# Patient Record
Sex: FEMALE | Race: OTHER | HISPANIC OR LATINO | URBAN - METROPOLITAN AREA
[De-identification: names, ages, dates, MRNs, and addresses within clinical notes are randomized per-mention and may not be internally consistent; named-entity substitution may affect disease eponyms.]

---

## 2017-06-28 ENCOUNTER — INPATIENT (INPATIENT)
Facility: HOSPITAL | Age: 27
LOS: 2 days | Discharge: HOME | End: 2017-07-01
Attending: OBSTETRICS & GYNECOLOGY | Admitting: OBSTETRICS & GYNECOLOGY
Payer: COMMERCIAL

## 2017-06-28 VITALS — WEIGHT: 147.93 LBS

## 2017-06-28 LAB
BASOPHILS NFR BLD AUTO: 0.2 % — SIGNIFICANT CHANGE UP (ref 0–2)
BLD GP AB SCN SERPL QL: NEGATIVE — SIGNIFICANT CHANGE UP
BLD GP AB SCN SERPL QL: NEGATIVE — SIGNIFICANT CHANGE UP
EOSINOPHIL NFR BLD AUTO: 0.7 % — SIGNIFICANT CHANGE UP (ref 0–6)
HCT VFR BLD CALC: 34.2 % — LOW (ref 34.5–45)
HGB BLD-MCNC: 11.4 G/DL — LOW (ref 11.5–15.5)
LYMPHOCYTES # BLD AUTO: 15.9 % — SIGNIFICANT CHANGE UP (ref 13–44)
MCHC RBC-ENTMCNC: 29 PG — SIGNIFICANT CHANGE UP (ref 27–34)
MCHC RBC-ENTMCNC: 33.3 G/DL — SIGNIFICANT CHANGE UP (ref 32–36)
MCV RBC AUTO: 87 FL — SIGNIFICANT CHANGE UP (ref 80–100)
MONOCYTES NFR BLD AUTO: 9.2 % — SIGNIFICANT CHANGE UP (ref 2–14)
NEUTROPHILS NFR BLD AUTO: 74 % — SIGNIFICANT CHANGE UP (ref 43–77)
PLATELET # BLD AUTO: 148 K/UL — LOW (ref 150–400)
RBC # BLD: 3.93 M/UL — SIGNIFICANT CHANGE UP (ref 3.8–5.2)
RBC # FLD: 13.3 % — SIGNIFICANT CHANGE UP (ref 10.3–16.9)
RH IG SCN BLD-IMP: POSITIVE — SIGNIFICANT CHANGE UP
RH IG SCN BLD-IMP: POSITIVE — SIGNIFICANT CHANGE UP
WBC # BLD: 9.7 K/UL — SIGNIFICANT CHANGE UP (ref 3.8–10.5)
WBC # FLD AUTO: 9.7 K/UL — SIGNIFICANT CHANGE UP (ref 3.8–10.5)

## 2017-06-28 RX ORDER — OXYTOCIN 10 UNIT/ML
333.33 VIAL (ML) INJECTION
Qty: 20 | Refills: 0 | Status: DISCONTINUED | OUTPATIENT
Start: 2017-06-28 | End: 2017-06-29

## 2017-06-28 RX ORDER — SODIUM CHLORIDE 9 MG/ML
1000 INJECTION, SOLUTION INTRAVENOUS
Qty: 0 | Refills: 0 | Status: DISCONTINUED | OUTPATIENT
Start: 2017-06-28 | End: 2017-06-29

## 2017-06-28 RX ORDER — SODIUM CHLORIDE 9 MG/ML
1000 INJECTION, SOLUTION INTRAVENOUS ONCE
Qty: 0 | Refills: 0 | Status: COMPLETED | OUTPATIENT
Start: 2017-06-28 | End: 2017-06-28

## 2017-06-28 RX ORDER — PENICILLIN G POTASSIUM 5000000 [IU]/1
5 POWDER, FOR SOLUTION INTRAMUSCULAR; INTRAPLEURAL; INTRATHECAL; INTRAVENOUS ONCE
Qty: 0 | Refills: 0 | Status: COMPLETED | OUTPATIENT
Start: 2017-06-28 | End: 2017-06-28

## 2017-06-28 RX ORDER — PENICILLIN G POTASSIUM 5000000 [IU]/1
POWDER, FOR SOLUTION INTRAMUSCULAR; INTRAPLEURAL; INTRATHECAL; INTRAVENOUS
Qty: 0 | Refills: 0 | Status: DISCONTINUED | OUTPATIENT
Start: 2017-06-28 | End: 2017-06-29

## 2017-06-28 RX ORDER — CITRIC ACID/SODIUM CITRATE 300-500 MG
15 SOLUTION, ORAL ORAL EVERY 4 HOURS
Qty: 0 | Refills: 0 | Status: DISCONTINUED | OUTPATIENT
Start: 2017-06-28 | End: 2017-06-29

## 2017-06-28 RX ORDER — PENICILLIN G POTASSIUM 5000000 [IU]/1
2.5 POWDER, FOR SOLUTION INTRAMUSCULAR; INTRAPLEURAL; INTRATHECAL; INTRAVENOUS EVERY 4 HOURS
Qty: 0 | Refills: 0 | Status: DISCONTINUED | OUTPATIENT
Start: 2017-06-28 | End: 2017-06-29

## 2017-06-28 RX ADMIN — SODIUM CHLORIDE 125 MILLILITER(S): 9 INJECTION, SOLUTION INTRAVENOUS at 21:27

## 2017-06-28 RX ADMIN — SODIUM CHLORIDE 2000 MILLILITER(S): 9 INJECTION, SOLUTION INTRAVENOUS at 18:50

## 2017-06-28 RX ADMIN — PENICILLIN G POTASSIUM 200 MILLION UNIT(S): 5000000 POWDER, FOR SOLUTION INTRAMUSCULAR; INTRAPLEURAL; INTRATHECAL; INTRAVENOUS at 22:55

## 2017-06-28 RX ADMIN — PENICILLIN G POTASSIUM 200 MILLION UNIT(S): 5000000 POWDER, FOR SOLUTION INTRAMUSCULAR; INTRAPLEURAL; INTRATHECAL; INTRAVENOUS at 19:00

## 2017-06-28 RX ADMIN — SODIUM CHLORIDE 125 MILLILITER(S): 9 INJECTION, SOLUTION INTRAVENOUS at 19:40

## 2017-06-29 ENCOUNTER — RESULT REVIEW (OUTPATIENT)
Age: 27
End: 2017-06-29

## 2017-06-29 LAB — T PALLIDUM AB TITR SER: NEGATIVE — SIGNIFICANT CHANGE UP

## 2017-06-29 RX ORDER — SIMETHICONE 80 MG/1
80 TABLET, CHEWABLE ORAL EVERY 6 HOURS
Qty: 0 | Refills: 0 | Status: DISCONTINUED | OUTPATIENT
Start: 2017-06-29 | End: 2017-07-01

## 2017-06-29 RX ORDER — MAGNESIUM HYDROXIDE 400 MG/1
30 TABLET, CHEWABLE ORAL
Qty: 0 | Refills: 0 | Status: DISCONTINUED | OUTPATIENT
Start: 2017-06-29 | End: 2017-07-01

## 2017-06-29 RX ORDER — ACETAMINOPHEN 500 MG
650 TABLET ORAL EVERY 6 HOURS
Qty: 0 | Refills: 0 | Status: DISCONTINUED | OUTPATIENT
Start: 2017-06-29 | End: 2017-07-01

## 2017-06-29 RX ORDER — PRAMOXINE HYDROCHLORIDE 150 MG/15G
1 AEROSOL, FOAM RECTAL EVERY 4 HOURS
Qty: 0 | Refills: 0 | Status: DISCONTINUED | OUTPATIENT
Start: 2017-06-29 | End: 2017-07-01

## 2017-06-29 RX ORDER — AER TRAVELER 0.5 G/1
1 SOLUTION RECTAL; TOPICAL EVERY 4 HOURS
Qty: 0 | Refills: 0 | Status: DISCONTINUED | OUTPATIENT
Start: 2017-06-29 | End: 2017-07-01

## 2017-06-29 RX ORDER — AMPICILLIN TRIHYDRATE 250 MG
2 CAPSULE ORAL EVERY 6 HOURS
Qty: 0 | Refills: 0 | Status: DISCONTINUED | OUTPATIENT
Start: 2017-06-29 | End: 2017-06-30

## 2017-06-29 RX ORDER — TETANUS TOXOID, REDUCED DIPHTHERIA TOXOID AND ACELLULAR PERTUSSIS VACCINE, ADSORBED 5; 2.5; 8; 8; 2.5 [IU]/.5ML; [IU]/.5ML; UG/.5ML; UG/.5ML; UG/.5ML
0.5 SUSPENSION INTRAMUSCULAR ONCE
Qty: 0 | Refills: 0 | Status: DISCONTINUED | OUTPATIENT
Start: 2017-06-29 | End: 2017-07-01

## 2017-06-29 RX ORDER — GLYCERIN ADULT
1 SUPPOSITORY, RECTAL RECTAL AT BEDTIME
Qty: 0 | Refills: 0 | Status: DISCONTINUED | OUTPATIENT
Start: 2017-06-29 | End: 2017-07-01

## 2017-06-29 RX ORDER — GENTAMICIN SULFATE 40 MG/ML
120 VIAL (ML) INJECTION ONCE
Qty: 0 | Refills: 0 | Status: COMPLETED | OUTPATIENT
Start: 2017-06-29 | End: 2017-06-29

## 2017-06-29 RX ORDER — SODIUM CHLORIDE 9 MG/ML
3 INJECTION INTRAMUSCULAR; INTRAVENOUS; SUBCUTANEOUS EVERY 8 HOURS
Qty: 0 | Refills: 0 | Status: DISCONTINUED | OUTPATIENT
Start: 2017-06-29 | End: 2017-07-01

## 2017-06-29 RX ORDER — GENTAMICIN SULFATE 40 MG/ML
VIAL (ML) INJECTION
Qty: 0 | Refills: 0 | Status: DISCONTINUED | OUTPATIENT
Start: 2017-06-29 | End: 2017-06-30

## 2017-06-29 RX ORDER — DIPHENHYDRAMINE HCL 50 MG
25 CAPSULE ORAL EVERY 6 HOURS
Qty: 0 | Refills: 0 | Status: DISCONTINUED | OUTPATIENT
Start: 2017-06-29 | End: 2017-07-01

## 2017-06-29 RX ORDER — OXYTOCIN 10 UNIT/ML
41.67 VIAL (ML) INJECTION
Qty: 20 | Refills: 0 | Status: DISCONTINUED | OUTPATIENT
Start: 2017-06-29 | End: 2017-07-01

## 2017-06-29 RX ORDER — LANOLIN
1 OINTMENT (GRAM) TOPICAL EVERY 6 HOURS
Qty: 0 | Refills: 0 | Status: DISCONTINUED | OUTPATIENT
Start: 2017-06-29 | End: 2017-07-01

## 2017-06-29 RX ORDER — IBUPROFEN 200 MG
600 TABLET ORAL EVERY 6 HOURS
Qty: 0 | Refills: 0 | Status: DISCONTINUED | OUTPATIENT
Start: 2017-06-29 | End: 2017-07-01

## 2017-06-29 RX ORDER — AMPICILLIN TRIHYDRATE 250 MG
2 CAPSULE ORAL ONCE
Qty: 0 | Refills: 0 | Status: COMPLETED | OUTPATIENT
Start: 2017-06-29 | End: 2017-06-29

## 2017-06-29 RX ORDER — DOCUSATE SODIUM 100 MG
100 CAPSULE ORAL
Qty: 0 | Refills: 0 | Status: DISCONTINUED | OUTPATIENT
Start: 2017-06-29 | End: 2017-07-01

## 2017-06-29 RX ORDER — DIBUCAINE 1 %
1 OINTMENT (GRAM) RECTAL EVERY 4 HOURS
Qty: 0 | Refills: 0 | Status: DISCONTINUED | OUTPATIENT
Start: 2017-06-29 | End: 2017-07-01

## 2017-06-29 RX ORDER — HYDROCORTISONE 1 %
1 OINTMENT (GRAM) TOPICAL EVERY 4 HOURS
Qty: 0 | Refills: 0 | Status: DISCONTINUED | OUTPATIENT
Start: 2017-06-29 | End: 2017-07-01

## 2017-06-29 RX ORDER — AMPICILLIN TRIHYDRATE 250 MG
CAPSULE ORAL
Qty: 0 | Refills: 0 | Status: DISCONTINUED | OUTPATIENT
Start: 2017-06-29 | End: 2017-06-30

## 2017-06-29 RX ORDER — GENTAMICIN SULFATE 40 MG/ML
80 VIAL (ML) INJECTION EVERY 8 HOURS
Qty: 0 | Refills: 0 | Status: DISCONTINUED | OUTPATIENT
Start: 2017-06-29 | End: 2017-06-30

## 2017-06-29 RX ORDER — OXYTOCIN 10 UNIT/ML
1 VIAL (ML) INJECTION
Qty: 30 | Refills: 0 | Status: DISCONTINUED | OUTPATIENT
Start: 2017-06-29 | End: 2017-06-29

## 2017-06-29 RX ADMIN — Medication 200 MILLIGRAM(S): at 22:09

## 2017-06-29 RX ADMIN — SODIUM CHLORIDE 3 MILLILITER(S): 9 INJECTION INTRAMUSCULAR; INTRAVENOUS; SUBCUTANEOUS at 23:10

## 2017-06-29 RX ADMIN — Medication 216 GRAM(S): at 05:33

## 2017-06-29 RX ADMIN — Medication 600 MILLIGRAM(S): at 07:15

## 2017-06-29 RX ADMIN — Medication 100 MILLIGRAM(S): at 23:23

## 2017-06-29 RX ADMIN — Medication 100 MILLIGRAM(S): at 03:16

## 2017-06-29 RX ADMIN — Medication 100 MILLIGRAM(S): at 15:13

## 2017-06-29 RX ADMIN — Medication 216 GRAM(S): at 19:41

## 2017-06-29 RX ADMIN — Medication 216 GRAM(S): at 11:47

## 2017-06-29 RX ADMIN — Medication 200 MILLIGRAM(S): at 03:49

## 2017-06-29 RX ADMIN — Medication 600 MILLIGRAM(S): at 06:47

## 2017-06-29 RX ADMIN — SODIUM CHLORIDE 3 MILLILITER(S): 9 INJECTION INTRAMUSCULAR; INTRAVENOUS; SUBCUTANEOUS at 15:11

## 2017-06-29 RX ADMIN — Medication 200 MILLIGRAM(S): at 14:30

## 2017-06-29 RX ADMIN — Medication 125 MILLIUNIT(S)/MIN: at 06:27

## 2017-06-29 RX ADMIN — Medication 600 MILLIGRAM(S): at 14:29

## 2017-06-29 RX ADMIN — Medication 1 MILLIUNIT(S)/MIN: at 03:24

## 2017-06-29 RX ADMIN — PENICILLIN G POTASSIUM 200 MILLION UNIT(S): 5000000 POWDER, FOR SOLUTION INTRAMUSCULAR; INTRAPLEURAL; INTRATHECAL; INTRAVENOUS at 02:45

## 2017-06-30 RX ADMIN — SODIUM CHLORIDE 3 MILLILITER(S): 9 INJECTION INTRAMUSCULAR; INTRAVENOUS; SUBCUTANEOUS at 14:27

## 2017-06-30 RX ADMIN — SODIUM CHLORIDE 3 MILLILITER(S): 9 INJECTION INTRAMUSCULAR; INTRAVENOUS; SUBCUTANEOUS at 06:44

## 2017-06-30 RX ADMIN — Medication 216 GRAM(S): at 13:14

## 2017-06-30 RX ADMIN — Medication 100 MILLIGRAM(S): at 14:39

## 2017-06-30 RX ADMIN — Medication 216 GRAM(S): at 06:43

## 2017-06-30 RX ADMIN — SODIUM CHLORIDE 3 MILLILITER(S): 9 INJECTION INTRAMUSCULAR; INTRAVENOUS; SUBCUTANEOUS at 22:22

## 2017-06-30 RX ADMIN — Medication 200 MILLIGRAM(S): at 06:44

## 2017-06-30 RX ADMIN — Medication 600 MILLIGRAM(S): at 06:41

## 2017-06-30 RX ADMIN — Medication 600 MILLIGRAM(S): at 13:15

## 2017-06-30 RX ADMIN — Medication 216 GRAM(S): at 00:50

## 2017-06-30 RX ADMIN — Medication 1 TABLET(S): at 13:15

## 2017-06-30 RX ADMIN — Medication 600 MILLIGRAM(S): at 20:35

## 2017-06-30 RX ADMIN — Medication 600 MILLIGRAM(S): at 19:56

## 2017-06-30 RX ADMIN — Medication 200 MILLIGRAM(S): at 15:20

## 2017-06-30 RX ADMIN — Medication 600 MILLIGRAM(S): at 13:47

## 2017-06-30 RX ADMIN — Medication 600 MILLIGRAM(S): at 07:17

## 2017-06-30 RX ADMIN — Medication 100 MILLIGRAM(S): at 06:45

## 2017-06-30 RX ADMIN — Medication 650 MILLIGRAM(S): at 23:12

## 2017-06-30 NOTE — PROGRESS NOTE ADULT - SUBJECTIVE AND OBJECTIVE BOX
Patient evaluated at bedside. Denies fever and chills.  She reports pain is well controlled.  She has been ambulating without assistance, voiding spontaneously, and is breastfeeding.  She has no concerns at this time.    Physical Exam:  T(C): 36.7 (06-30-17 @ 06:00), Max: 37.4 (06-29-17 @ 22:35)  HR: 77 (06-30-17 @ 06:00) (77 - 89)  BP: 110/76 (06-30-17 @ 06:00) (90/60 - 110/76)  RR: 18 (06-30-17 @ 06:00) (18 - 18)  SpO2: 100% (06-30-17 @ 06:00) (98% - 100%)  Wt(kg): --    GA: NAD, A+0 x 3  Abd: soft, nontender, nondistended, no rebound or guarding, uterus firm at midline, below the umbilicus  : lochia WNL  Extremities: no calf tenderness                            11.4   9.7   )-----------( 148      ( 28 Jun 2017 19:19 )             34.2

## 2017-06-30 NOTE — PROGRESS NOTE ADULT - ASSESSMENT
A/P 26y PPD# s/p  c/b chorioamnionitis, stable  1. chorioamnionitis - IV a/g/c for 24 hours  2. Pain: well controlled on OPM  2. GI: Regular diet  3. : voiding  4. DVT prophylaxis: ambulate  5. Dispo: PPD 2, unless otherwise specified

## 2017-07-01 VITALS
HEART RATE: 92 BPM | OXYGEN SATURATION: 98 % | DIASTOLIC BLOOD PRESSURE: 70 MMHG | RESPIRATION RATE: 18 BRPM | TEMPERATURE: 98 F | SYSTOLIC BLOOD PRESSURE: 109 MMHG

## 2017-07-01 PROCEDURE — 36415 COLL VENOUS BLD VENIPUNCTURE: CPT

## 2017-07-01 PROCEDURE — 88307 TISSUE EXAM BY PATHOLOGIST: CPT

## 2017-07-01 PROCEDURE — 86900 BLOOD TYPING SEROLOGIC ABO: CPT

## 2017-07-01 PROCEDURE — 86850 RBC ANTIBODY SCREEN: CPT

## 2017-07-01 PROCEDURE — 85025 COMPLETE CBC W/AUTO DIFF WBC: CPT

## 2017-07-01 PROCEDURE — 86901 BLOOD TYPING SEROLOGIC RH(D): CPT

## 2017-07-01 PROCEDURE — 86780 TREPONEMA PALLIDUM: CPT

## 2017-07-01 RX ORDER — IBUPROFEN 200 MG
1 TABLET ORAL
Qty: 0 | Refills: 0 | COMMUNITY
Start: 2017-07-01

## 2017-07-01 RX ADMIN — Medication 1 TABLET(S): at 09:55

## 2017-07-01 RX ADMIN — Medication 600 MILLIGRAM(S): at 07:28

## 2017-07-01 RX ADMIN — SODIUM CHLORIDE 3 MILLILITER(S): 9 INJECTION INTRAMUSCULAR; INTRAVENOUS; SUBCUTANEOUS at 06:11

## 2017-07-01 RX ADMIN — Medication 650 MILLIGRAM(S): at 00:05

## 2017-07-01 RX ADMIN — Medication 600 MILLIGRAM(S): at 08:09

## 2017-07-01 NOTE — DISCHARGE NOTE OB - HOSPITAL COURSE
Pt is s/p . Today is PPD#2 and she is currently stable. She was diagnosed with chorioamnionitis and was treated with IV ampicillin, gentamicin, and clindamycin for 24 hrs. She is afebrile, WBC wnl. Her pain is under control with ibuprofen. She will be discharged home.

## 2017-07-01 NOTE — PROGRESS NOTE ADULT - SUBJECTIVE AND OBJECTIVE BOX
Patient evaluated at bedside.   She reports pain is well controlled.  She denies headache, dizziness, chest pain, palpitations, shortness of breathe, nausea, vomiting, heavy vaginal bleeding or perineal discomfort.  She has been ambulating without assistance, voiding spontaneously, and is breastfeeding.    Physical Exam:  Vital Signs Last 24 Hrs  T(C): 36.8 (01 Jul 2017 06:05), Max: 36.8 (30 Jun 2017 18:37)  T(F): 98.3 (01 Jul 2017 06:05), Max: 98.3 (01 Jul 2017 06:05)  HR: 84 (01 Jul 2017 06:05) (73 - 84)  BP: 112/69 (01 Jul 2017 06:05) (104/74 - 112/69)  BP(mean): --  RR: 18 (01 Jul 2017 06:05) (18 - 18)  SpO2: 99% (01 Jul 2017 06:05) (99% - 99%)    GA: NAD, A+0 x 3  CV: RRR  Pulm: CTAB  Breasts: soft, nontender, no palpable masses  Abd: + BS, soft, nontender, nondistended, no rebound or guarding, uterus firm at midline, 2 fb below umbilicus  : lochia WNL  Extremities: no swelling or calf tenderness, reflexes +2 bilaterally

## 2017-07-01 NOTE — DISCHARGE NOTE OB - CARE PROVIDER_API CALL
Alex Barba), Obstetrics and Gynecology  [address] [place] [address]  Phone: [phone]  Fax: [phone]

## 2017-07-01 NOTE — DISCHARGE NOTE OB - PATIENT PORTAL LINK FT
“You can access the FollowHealth Patient Portal, offered by [facility], by registering with the following website: [email or web address]”

## 2017-07-01 NOTE — PROGRESS NOTE ADULT - ASSESSMENT
A/P yo 26y s/p , PPD #2, stable  - Chorio: afebrile, asymptomatic. S/p IV amp/gent/clinda x24 hrs afebrile.  - Pain: Motrin prn  - GI: Reg diet  - Encourage breastfeeding  - DVT prophylaxis: early ambulation  - Dispo: PP2

## 2017-07-01 NOTE — DISCHARGE NOTE OB - MEDICATION SUMMARY - MEDICATIONS TO TAKE
I will START or STAY ON the medications listed below when I get home from the hospital:    ibuprofen 600 mg oral tablet  -- 1 tab(s) by mouth every 6 hours, As needed, Moderate Pain  -- Indication: For PREGNANCY

## 2017-07-03 LAB — SURGICAL PATHOLOGY STUDY: SIGNIFICANT CHANGE UP

## 2017-07-05 DIAGNOSIS — O41.1230 CHORIOAMNIONITIS, THIRD TRIMESTER, NOT APPLICABLE OR UNSPECIFIED: ICD-10-CM

## 2017-07-05 DIAGNOSIS — O99.820 STREPTOCOCCUS B CARRIER STATE COMPLICATING PREGNANCY: ICD-10-CM

## 2017-07-05 DIAGNOSIS — Z34.03 ENCOUNTER FOR SUPERVISION OF NORMAL FIRST PREGNANCY, THIRD TRIMESTER: ICD-10-CM

## 2017-07-05 DIAGNOSIS — Z53.29 PROCEDURE AND TREATMENT NOT CARRIED OUT BECAUSE OF PATIENT'S DECISION FOR OTHER REASONS: ICD-10-CM

## 2017-07-05 DIAGNOSIS — Z3A.37 37 WEEKS GESTATION OF PREGNANCY: ICD-10-CM

## 2022-04-01 ENCOUNTER — APPOINTMENT (OUTPATIENT)
Dept: ANTEPARTUM | Facility: CLINIC | Age: 32
End: 2022-04-01
Payer: COMMERCIAL

## 2022-04-01 ENCOUNTER — ASOB RESULT (OUTPATIENT)
Age: 32
End: 2022-04-01

## 2022-04-01 PROCEDURE — 76805 OB US >/= 14 WKS SNGL FETUS: CPT

## 2022-04-01 PROCEDURE — 76817 TRANSVAGINAL US OBSTETRIC: CPT

## 2022-04-18 ENCOUNTER — APPOINTMENT (OUTPATIENT)
Dept: ANTEPARTUM | Facility: CLINIC | Age: 32
End: 2022-04-18
Payer: COMMERCIAL

## 2022-04-18 ENCOUNTER — ASOB RESULT (OUTPATIENT)
Age: 32
End: 2022-04-18

## 2022-04-18 PROCEDURE — 76816 OB US FOLLOW-UP PER FETUS: CPT

## 2022-07-26 ENCOUNTER — INPATIENT (INPATIENT)
Facility: HOSPITAL | Age: 32
LOS: 1 days | Discharge: HOME | End: 2022-07-28
Attending: OBSTETRICS & GYNECOLOGY | Admitting: OBSTETRICS & GYNECOLOGY
Payer: COMMERCIAL

## 2022-07-26 VITALS — HEIGHT: 63 IN | WEIGHT: 162.92 LBS

## 2022-07-26 LAB
BASOPHILS # BLD AUTO: 0.03 K/UL — SIGNIFICANT CHANGE UP (ref 0–0.2)
BASOPHILS NFR BLD AUTO: 0.4 % — SIGNIFICANT CHANGE UP (ref 0–2)
BLD GP AB SCN SERPL QL: NEGATIVE — SIGNIFICANT CHANGE UP
COVID-19 SPIKE DOMAIN AB INTERP: POSITIVE
COVID-19 SPIKE DOMAIN ANTIBODY RESULT: >250 U/ML — HIGH
EOSINOPHIL # BLD AUTO: 0.08 K/UL — SIGNIFICANT CHANGE UP (ref 0–0.5)
EOSINOPHIL NFR BLD AUTO: 1 % — SIGNIFICANT CHANGE UP (ref 0–6)
HCT VFR BLD CALC: 33.3 % — LOW (ref 34.5–45)
HGB BLD-MCNC: 10.7 G/DL — LOW (ref 11.5–15.5)
IMM GRANULOCYTES NFR BLD AUTO: 1.3 % — SIGNIFICANT CHANGE UP (ref 0–1.5)
LYMPHOCYTES # BLD AUTO: 1.61 K/UL — SIGNIFICANT CHANGE UP (ref 1–3.3)
LYMPHOCYTES # BLD AUTO: 20.3 % — SIGNIFICANT CHANGE UP (ref 13–44)
MCHC RBC-ENTMCNC: 27.7 PG — SIGNIFICANT CHANGE UP (ref 27–34)
MCHC RBC-ENTMCNC: 32.1 GM/DL — SIGNIFICANT CHANGE UP (ref 32–36)
MCV RBC AUTO: 86.3 FL — SIGNIFICANT CHANGE UP (ref 80–100)
MONOCYTES # BLD AUTO: 0.59 K/UL — SIGNIFICANT CHANGE UP (ref 0–0.9)
MONOCYTES NFR BLD AUTO: 7.4 % — SIGNIFICANT CHANGE UP (ref 2–14)
NEUTROPHILS # BLD AUTO: 5.52 K/UL — SIGNIFICANT CHANGE UP (ref 1.8–7.4)
NEUTROPHILS NFR BLD AUTO: 69.6 % — SIGNIFICANT CHANGE UP (ref 43–77)
NRBC # BLD: 0 /100 WBCS — SIGNIFICANT CHANGE UP (ref 0–0)
PLATELET # BLD AUTO: 162 K/UL — SIGNIFICANT CHANGE UP (ref 150–400)
RBC # BLD: 3.86 M/UL — SIGNIFICANT CHANGE UP (ref 3.8–5.2)
RBC # FLD: 13.9 % — SIGNIFICANT CHANGE UP (ref 10.3–14.5)
RH IG SCN BLD-IMP: POSITIVE — SIGNIFICANT CHANGE UP
RH IG SCN BLD-IMP: POSITIVE — SIGNIFICANT CHANGE UP
SARS-COV-2 IGG+IGM SERPL QL IA: >250 U/ML — HIGH
SARS-COV-2 IGG+IGM SERPL QL IA: POSITIVE
SARS-COV-2 RNA SPEC QL NAA+PROBE: NEGATIVE — SIGNIFICANT CHANGE UP
T PALLIDUM AB TITR SER: NEGATIVE — SIGNIFICANT CHANGE UP
WBC # BLD: 7.93 K/UL — SIGNIFICANT CHANGE UP (ref 3.8–10.5)
WBC # FLD AUTO: 7.93 K/UL — SIGNIFICANT CHANGE UP (ref 3.8–10.5)

## 2022-07-26 RX ORDER — HYDROCORTISONE 1 %
1 OINTMENT (GRAM) TOPICAL EVERY 6 HOURS
Refills: 0 | Status: DISCONTINUED | OUTPATIENT
Start: 2022-07-26 | End: 2022-07-28

## 2022-07-26 RX ORDER — IBUPROFEN 200 MG
600 TABLET ORAL EVERY 6 HOURS
Refills: 0 | Status: COMPLETED | OUTPATIENT
Start: 2022-07-26 | End: 2023-06-24

## 2022-07-26 RX ORDER — ACETAMINOPHEN 500 MG
975 TABLET ORAL
Refills: 0 | Status: DISCONTINUED | OUTPATIENT
Start: 2022-07-26 | End: 2022-07-28

## 2022-07-26 RX ORDER — DIPHENHYDRAMINE HCL 50 MG
25 CAPSULE ORAL EVERY 6 HOURS
Refills: 0 | Status: DISCONTINUED | OUTPATIENT
Start: 2022-07-26 | End: 2022-07-28

## 2022-07-26 RX ORDER — OXYCODONE HYDROCHLORIDE 5 MG/1
5 TABLET ORAL ONCE
Refills: 0 | Status: DISCONTINUED | OUTPATIENT
Start: 2022-07-26 | End: 2022-07-28

## 2022-07-26 RX ORDER — PRAMOXINE HYDROCHLORIDE 150 MG/15G
1 AEROSOL, FOAM RECTAL EVERY 4 HOURS
Refills: 0 | Status: DISCONTINUED | OUTPATIENT
Start: 2022-07-26 | End: 2022-07-28

## 2022-07-26 RX ORDER — CHLORHEXIDINE GLUCONATE 213 G/1000ML
1 SOLUTION TOPICAL ONCE
Refills: 0 | Status: DISCONTINUED | OUTPATIENT
Start: 2022-07-26 | End: 2022-07-26

## 2022-07-26 RX ORDER — BENZOCAINE 10 %
1 GEL (GRAM) MUCOUS MEMBRANE EVERY 6 HOURS
Refills: 0 | Status: DISCONTINUED | OUTPATIENT
Start: 2022-07-26 | End: 2022-07-28

## 2022-07-26 RX ORDER — IBUPROFEN 200 MG
600 TABLET ORAL EVERY 6 HOURS
Refills: 0 | Status: DISCONTINUED | OUTPATIENT
Start: 2022-07-26 | End: 2022-07-28

## 2022-07-26 RX ORDER — AER TRAVELER 0.5 G/1
1 SOLUTION RECTAL; TOPICAL EVERY 4 HOURS
Refills: 0 | Status: DISCONTINUED | OUTPATIENT
Start: 2022-07-26 | End: 2022-07-28

## 2022-07-26 RX ORDER — SODIUM CHLORIDE 9 MG/ML
1000 INJECTION, SOLUTION INTRAVENOUS
Refills: 0 | Status: DISCONTINUED | OUTPATIENT
Start: 2022-07-26 | End: 2022-07-26

## 2022-07-26 RX ORDER — CITRIC ACID/SODIUM CITRATE 300-500 MG
15 SOLUTION, ORAL ORAL EVERY 6 HOURS
Refills: 0 | Status: DISCONTINUED | OUTPATIENT
Start: 2022-07-26 | End: 2022-07-26

## 2022-07-26 RX ORDER — OXYTOCIN 10 UNIT/ML
333.33 VIAL (ML) INJECTION
Qty: 20 | Refills: 0 | Status: DISCONTINUED | OUTPATIENT
Start: 2022-07-26 | End: 2022-07-26

## 2022-07-26 RX ORDER — SODIUM CHLORIDE 9 MG/ML
3 INJECTION INTRAMUSCULAR; INTRAVENOUS; SUBCUTANEOUS EVERY 8 HOURS
Refills: 0 | Status: DISCONTINUED | OUTPATIENT
Start: 2022-07-26 | End: 2022-07-28

## 2022-07-26 RX ORDER — MAGNESIUM HYDROXIDE 400 MG/1
30 TABLET, CHEWABLE ORAL
Refills: 0 | Status: DISCONTINUED | OUTPATIENT
Start: 2022-07-26 | End: 2022-07-28

## 2022-07-26 RX ORDER — TETANUS TOXOID, REDUCED DIPHTHERIA TOXOID AND ACELLULAR PERTUSSIS VACCINE, ADSORBED 5; 2.5; 8; 8; 2.5 [IU]/.5ML; [IU]/.5ML; UG/.5ML; UG/.5ML; UG/.5ML
0.5 SUSPENSION INTRAMUSCULAR ONCE
Refills: 0 | Status: DISCONTINUED | OUTPATIENT
Start: 2022-07-26 | End: 2022-07-28

## 2022-07-26 RX ORDER — FENTANYL/BUPIVACAINE/NS/PF 2MCG/ML-.1
250 PLASTIC BAG, INJECTION (ML) INJECTION
Refills: 0 | Status: DISCONTINUED | OUTPATIENT
Start: 2022-07-26 | End: 2022-07-26

## 2022-07-26 RX ORDER — LANOLIN
1 OINTMENT (GRAM) TOPICAL EVERY 6 HOURS
Refills: 0 | Status: DISCONTINUED | OUTPATIENT
Start: 2022-07-26 | End: 2022-07-28

## 2022-07-26 RX ORDER — DIBUCAINE 1 %
1 OINTMENT (GRAM) RECTAL EVERY 6 HOURS
Refills: 0 | Status: DISCONTINUED | OUTPATIENT
Start: 2022-07-26 | End: 2022-07-28

## 2022-07-26 RX ORDER — SIMETHICONE 80 MG/1
80 TABLET, CHEWABLE ORAL EVERY 4 HOURS
Refills: 0 | Status: DISCONTINUED | OUTPATIENT
Start: 2022-07-26 | End: 2022-07-28

## 2022-07-26 RX ORDER — KETOROLAC TROMETHAMINE 30 MG/ML
30 SYRINGE (ML) INJECTION ONCE
Refills: 0 | Status: DISCONTINUED | OUTPATIENT
Start: 2022-07-26 | End: 2022-07-26

## 2022-07-26 RX ORDER — OXYTOCIN 10 UNIT/ML
333.33 VIAL (ML) INJECTION
Qty: 20 | Refills: 0 | Status: DISCONTINUED | OUTPATIENT
Start: 2022-07-26 | End: 2022-07-28

## 2022-07-26 RX ORDER — OXYCODONE HYDROCHLORIDE 5 MG/1
5 TABLET ORAL
Refills: 0 | Status: DISCONTINUED | OUTPATIENT
Start: 2022-07-26 | End: 2022-07-28

## 2022-07-26 RX ADMIN — Medication 975 MILLIGRAM(S): at 11:10

## 2022-07-26 RX ADMIN — Medication 975 MILLIGRAM(S): at 16:45

## 2022-07-26 RX ADMIN — Medication 600 MILLIGRAM(S): at 21:40

## 2022-07-26 RX ADMIN — Medication 30 MILLIGRAM(S): at 07:48

## 2022-07-26 RX ADMIN — Medication 600 MILLIGRAM(S): at 20:40

## 2022-07-26 RX ADMIN — Medication 600 MILLIGRAM(S): at 13:35

## 2022-07-26 RX ADMIN — Medication 975 MILLIGRAM(S): at 23:23

## 2022-07-26 RX ADMIN — Medication 1 TABLET(S): at 13:35

## 2022-07-26 RX ADMIN — SODIUM CHLORIDE 3 MILLILITER(S): 9 INJECTION INTRAMUSCULAR; INTRAVENOUS; SUBCUTANEOUS at 21:16

## 2022-07-26 NOTE — LACTATION INITIAL EVALUATION - NS LACT CON REASON FOR REQ
38wk baby seen around 12 hrs of life. This is a second  baby for mom,  current 3 year old for 3 years with good milk supply noted. Baby placed skin to skin on mothers chest. Complete breastfeeding education was provided, manual expression technique taught with proper return demo, colostrum easily expressible bilaterally and drops offered to baby until baby began to wake and root towards breast. Positioning and latch strategies were taught. In a cradle hold, baby deeply latched and developed an organized, rhythmic suck with interittent swallows noted. Baby continues to feed for 10+ min and mother confirms a strong pull of the nipple and initial soreness that discipates with sustained deep sucking.  Responsive/ frequent feeds, continued/ increased SSC and rooming-in were encouraged. All questions were answered, mother verbalized understanding of teaching and info given./NICU admission

## 2022-07-26 NOTE — LACTATION INITIAL EVALUATION - LACTATION INTERVENTIONS
initiate/review safe skin-to-skin/initiate/review hand expression/initiate/review techniques for position and latch/reviewed components of an effective feeding and at least 8 effective feedings per day required/reviewed benefits and recommendations for rooming in/reviewed feeding on demand/by cue at least 8 times a day/reviewed indications of inadequate milk transfer that would require supplementation

## 2022-07-26 NOTE — PATIENT PROFILE OB - POST PARTUM DEPRESSION SCREEN OB 5
Currently in a flare. Will offer Bentyl for discomfort. Referral placed to [address] [place] [address] no

## 2022-07-26 NOTE — LACTATION INITIAL EVALUATION - LATCH: COMFORT (BREAST/NIPPLE) INFANT
This is a recent snapshot of the patient's [facility] medical record.  For current drug dose and complete information and questions, call [phone] or In Basket pool, [facility]v home infusion ([id number])  CSN Number:  [id number]    
(2) soft/nontender

## 2022-07-27 ENCOUNTER — TRANSCRIPTION ENCOUNTER (OUTPATIENT)
Age: 32
End: 2022-07-27

## 2022-07-27 RX ORDER — ACETAMINOPHEN 500 MG
3 TABLET ORAL
Qty: 0 | Refills: 0 | DISCHARGE
Start: 2022-07-27

## 2022-07-27 RX ADMIN — Medication 975 MILLIGRAM(S): at 23:54

## 2022-07-27 RX ADMIN — Medication 1 TABLET(S): at 14:42

## 2022-07-27 RX ADMIN — Medication 600 MILLIGRAM(S): at 02:04

## 2022-07-27 RX ADMIN — Medication 600 MILLIGRAM(S): at 22:14

## 2022-07-27 RX ADMIN — Medication 975 MILLIGRAM(S): at 18:59

## 2022-07-27 RX ADMIN — Medication 600 MILLIGRAM(S): at 15:35

## 2022-07-27 RX ADMIN — Medication 975 MILLIGRAM(S): at 13:42

## 2022-07-27 RX ADMIN — Medication 975 MILLIGRAM(S): at 07:40

## 2022-07-27 RX ADMIN — Medication 975 MILLIGRAM(S): at 06:40

## 2022-07-27 RX ADMIN — Medication 600 MILLIGRAM(S): at 21:14

## 2022-07-27 RX ADMIN — SODIUM CHLORIDE 3 MILLILITER(S): 9 INJECTION INTRAMUSCULAR; INTRAVENOUS; SUBCUTANEOUS at 22:00

## 2022-07-27 RX ADMIN — Medication 975 MILLIGRAM(S): at 00:23

## 2022-07-27 RX ADMIN — Medication 600 MILLIGRAM(S): at 03:04

## 2022-07-27 RX ADMIN — Medication 975 MILLIGRAM(S): at 12:00

## 2022-07-27 RX ADMIN — SODIUM CHLORIDE 3 MILLILITER(S): 9 INJECTION INTRAMUSCULAR; INTRAVENOUS; SUBCUTANEOUS at 06:05

## 2022-07-27 RX ADMIN — SODIUM CHLORIDE 3 MILLILITER(S): 9 INJECTION INTRAMUSCULAR; INTRAVENOUS; SUBCUTANEOUS at 14:42

## 2022-07-27 RX ADMIN — Medication 600 MILLIGRAM(S): at 09:17

## 2022-07-27 RX ADMIN — Medication 975 MILLIGRAM(S): at 18:00

## 2022-07-27 RX ADMIN — Medication 600 MILLIGRAM(S): at 10:42

## 2022-07-27 NOTE — DISCHARGE NOTE OB - NS MD DC FALL RISK RISK
For information on Fall & Injury Prevention, visit: [email or web address] OR  [email or web address] OR  https://www.cdc.gov/steadi/patient.html

## 2022-07-27 NOTE — DISCHARGE NOTE OB - PATIENT PORTAL LINK FT
You can access the FollowMyHealth Patient Portal offered by [facility] by registering at the following website: [email or web address]. By joining [employer]’s FollowMyHealth portal, you will also be able to view your health information using other applications (apps) compatible with our system.

## 2022-07-27 NOTE — DISCHARGE NOTE OB - CARE PLAN
1 Principal Discharge DX:	Vaginal delivery  Assessment and plan of treatment:	Patient is status post a vaginal delivery. She had an uncomplicated postpartum course and has met her postpartum milestones appropriately.  Vitals are stable for discharge.

## 2022-07-27 NOTE — DISCHARGE NOTE OB - CARE PROVIDER_API CALL
Alex Barba)  Obstetrics and Gynecology  [address] [place] [address]  Phone: [phone]  Fax: [phone]  Follow Up Time:

## 2022-07-27 NOTE — DISCHARGE NOTE OB - MEDICATION SUMMARY - MEDICATIONS TO TAKE
I will START or STAY ON the medications listed below when I get home from the hospital:    ibuprofen 600 mg oral tablet  -- 1 tab(s) by mouth every 6 hours, As needed, Moderate Pain  -- Indication: For Pain     acetaminophen 325 mg oral tablet  -- 3 tab(s) by mouth every 6 hours  -- Indication: For Pain

## 2022-07-28 VITALS
TEMPERATURE: 98 F | HEART RATE: 78 BPM | OXYGEN SATURATION: 99 % | RESPIRATION RATE: 18 BRPM | SYSTOLIC BLOOD PRESSURE: 116 MMHG | DIASTOLIC BLOOD PRESSURE: 70 MMHG

## 2022-07-28 PROCEDURE — 86769 SARS-COV-2 COVID-19 ANTIBODY: CPT

## 2022-07-28 PROCEDURE — 86780 TREPONEMA PALLIDUM: CPT

## 2022-07-28 PROCEDURE — 86850 RBC ANTIBODY SCREEN: CPT

## 2022-07-28 PROCEDURE — 36415 COLL VENOUS BLD VENIPUNCTURE: CPT

## 2022-07-28 PROCEDURE — 86900 BLOOD TYPING SEROLOGIC ABO: CPT

## 2022-07-28 PROCEDURE — 87635 SARS-COV-2 COVID-19 AMP PRB: CPT

## 2022-07-28 PROCEDURE — 59050 FETAL MONITOR W/REPORT: CPT

## 2022-07-28 PROCEDURE — 86901 BLOOD TYPING SEROLOGIC RH(D): CPT

## 2022-07-28 PROCEDURE — 85025 COMPLETE CBC W/AUTO DIFF WBC: CPT

## 2022-07-28 RX ADMIN — Medication 975 MILLIGRAM(S): at 13:46

## 2022-07-28 RX ADMIN — Medication 975 MILLIGRAM(S): at 12:15

## 2022-07-28 RX ADMIN — Medication 975 MILLIGRAM(S): at 00:54

## 2022-07-28 RX ADMIN — Medication 975 MILLIGRAM(S): at 06:15

## 2022-07-28 RX ADMIN — Medication 600 MILLIGRAM(S): at 09:18

## 2022-07-28 RX ADMIN — SODIUM CHLORIDE 3 MILLILITER(S): 9 INJECTION INTRAMUSCULAR; INTRAVENOUS; SUBCUTANEOUS at 07:40

## 2022-07-28 RX ADMIN — Medication 975 MILLIGRAM(S): at 05:14

## 2022-07-28 RX ADMIN — Medication 1 TABLET(S): at 12:25

## 2022-07-28 RX ADMIN — Medication 600 MILLIGRAM(S): at 10:00

## 2022-07-28 NOTE — PROGRESS NOTE ADULT - ASSESSMENT
PPD#1 sp   1-ambulation  2-reg diet
A/P 32y s/p , PPD#1, stable, meeting postpartum milestones   - Pain: well controlled on tylenol/motrin  - GI: Tolerating regular diet  - : urinating without difficulty/pain  - DVT prophylaxis: ambulating frequently  - Dispo: PPD 2, unless otherwise specified    
A/P 32y s/p , PPD#2, stable, meeting postpartum milestones   - Pain: well controlled on tylenol/motrin  - GI: Tolerating regular diet  - : urinating without difficulty/pain  - DVT prophylaxis: ambulating frequently  - Dispo: PPD 2, unless otherwise specified

## 2022-07-28 NOTE — PROGRESS NOTE ADULT - SUBJECTIVE AND OBJECTIVE BOX
Patient evaluated at bedside this morning, resting comfortably in bed, no acute events overnight.  She reports pain is well controlled with tylenol and motrin.  Reports decrease in amount of vaginal bleeding.  She has been ambulating without assistance, voiding spontaneously.  Tolerating food well, without nausea/vomit.      Physical Exam:  T(C): 36.9 (07-28-22 @ 05:34), Max: 36.9 (07-28-22 @ 05:34)  HR: 71 (07-28-22 @ 05:34) (71 - 71)  BP: 128/81 (07-28-22 @ 05:34) (128/81 - 128/81)  RR: 18 (07-28-22 @ 05:34) (18 - 18)  SpO2: 98% (07-28-22 @ 05:34) (98% - 98%)    GA: NAD, patient is alert and oriented  Resp: No increased work of breathing, breathing comfortably on RA  Abd: Soft, nontender, nondistended, no rebound or guarding, uterus firm.  Extremities: no swelling or calf tenderness    acetaminophen     Tablet .. 975 milliGRAM(s) Oral <User Schedule>  benzocaine 20%/menthol 0.5% Spray 1 Spray(s) Topical every 6 hours PRN  dibucaine 1% Ointment 1 Application(s) Topical every 6 hours PRN  diphenhydrAMINE 25 milliGRAM(s) Oral every 6 hours PRN  diphtheria/tetanus/pertussis (acellular) Vaccine (ADAcel) 0.5 milliLiter(s) IntraMuscular once  hydrocortisone 1% Cream 1 Application(s) Topical every 6 hours PRN  ibuprofen  Tablet. 600 milliGRAM(s) Oral every 6 hours  lanolin Ointment 1 Application(s) Topical every 6 hours PRN  magnesium hydroxide Suspension 30 milliLiter(s) Oral two times a day PRN  oxyCODONE    IR 5 milliGRAM(s) Oral every 3 hours PRN  oxyCODONE    IR 5 milliGRAM(s) Oral once PRN  oxytocin Infusion 333.333 milliUNIT(s)/Min IV Continuous <Continuous>  pramoxine 1%/zinc 5% Cream 1 Application(s) Topical every 4 hours PRN  prenatal multivitamin 1 Tablet(s) Oral daily  simethicone 80 milliGRAM(s) Chew every 4 hours PRN  sodium chloride 0.9% lock flush 3 milliLiter(s) IV Push every 8 hours  witch hazel Pads 1 Application(s) Topical every 4 hours PRN  
Patient evaluated at bedside this morning, resting comfortably in bed, no acute events overnight.  She reports pain is well controlled with tylenol and motrin.  She denies headache, dizziness, shortness of breath, nausea, vomiting. Reports decrease in amount of vaginal bleeding.  She has been ambulating without assistance, voiding spontaneously.  Tolerating food well, without nausea/vomit.      Physical Exam:  T(C): 36.7 (07-26-22 @ 22:15), Max: 36.7 (07-26-22 @ 22:15)  HR: 82 (07-26-22 @ 22:15) (82 - 82)  BP: 118/71 (07-26-22 @ 22:15) (118/71 - 118/71)  RR: 18 (07-26-22 @ 22:15) (18 - 18)  SpO2: 99% (07-26-22 @ 22:15) (99% - 99%)    GA: NAD, patient is alert and oriented  Resp: No increased work of breathing, breathing comfortably on RA  Abd: Soft, nontender, nondistended, no rebound or guarding, uterus firm.  Extremities: no swelling or calf tenderness                          10.7   7.93  )-----------( 162      ( 26 Jul 2022 02:45 )             33.3           acetaminophen     Tablet .. 975 milliGRAM(s) Oral <User Schedule>  benzocaine 20%/menthol 0.5% Spray 1 Spray(s) Topical every 6 hours PRN  dibucaine 1% Ointment 1 Application(s) Topical every 6 hours PRN  diphenhydrAMINE 25 milliGRAM(s) Oral every 6 hours PRN  diphtheria/tetanus/pertussis (acellular) Vaccine (ADAcel) 0.5 milliLiter(s) IntraMuscular once  hydrocortisone 1% Cream 1 Application(s) Topical every 6 hours PRN  ibuprofen  Tablet. 600 milliGRAM(s) Oral every 6 hours  lanolin Ointment 1 Application(s) Topical every 6 hours PRN  magnesium hydroxide Suspension 30 milliLiter(s) Oral two times a day PRN  oxyCODONE    IR 5 milliGRAM(s) Oral every 3 hours PRN  oxyCODONE    IR 5 milliGRAM(s) Oral once PRN  oxytocin Infusion 333.333 milliUNIT(s)/Min IV Continuous <Continuous>  pramoxine 1%/zinc 5% Cream 1 Application(s) Topical every 4 hours PRN  prenatal multivitamin 1 Tablet(s) Oral daily  simethicone 80 milliGRAM(s) Chew every 4 hours PRN  sodium chloride 0.9% lock flush 3 milliLiter(s) IV Push every 8 hours  witch hazel Pads 1 Application(s) Topical every 4 hours PRN  
pt without complaints

## 2022-08-01 DIAGNOSIS — O42.92 FULL-TERM PREMATURE RUPTURE OF MEMBRANES, UNSPECIFIED AS TO LENGTH OF TIME BETWEEN RUPTURE AND ONSET OF LABOR: ICD-10-CM

## 2022-08-01 DIAGNOSIS — Z3A.38 38 WEEKS GESTATION OF PREGNANCY: ICD-10-CM

## 2022-08-01 DIAGNOSIS — Z34.83 ENCOUNTER FOR SUPERVISION OF OTHER NORMAL PREGNANCY, THIRD TRIMESTER: ICD-10-CM

## 2022-11-01 NOTE — DISCHARGE NOTE OB - CARE PLAN
error   Principal Discharge DX:	Postpartum state  Goal:	Pain control, bonding with child  Instructions for follow-up, activity and diet:	Pelvic rest

## 2022-11-26 ENCOUNTER — EMERGENCY (EMERGENCY)
Facility: HOSPITAL | Age: 32
LOS: 1 days | Discharge: HOME | End: 2022-11-26
Attending: STUDENT IN AN ORGANIZED HEALTH CARE EDUCATION/TRAINING PROGRAM | Admitting: STUDENT IN AN ORGANIZED HEALTH CARE EDUCATION/TRAINING PROGRAM

## 2022-11-26 VITALS
OXYGEN SATURATION: 100 % | HEART RATE: 71 BPM | RESPIRATION RATE: 16 BRPM | TEMPERATURE: 98 F | SYSTOLIC BLOOD PRESSURE: 107 MMHG | DIASTOLIC BLOOD PRESSURE: 64 MMHG

## 2022-11-26 VITALS
SYSTOLIC BLOOD PRESSURE: 119 MMHG | DIASTOLIC BLOOD PRESSURE: 78 MMHG | RESPIRATION RATE: 16 BRPM | OXYGEN SATURATION: 100 % | TEMPERATURE: 98 F | HEART RATE: 102 BPM

## 2022-11-26 LAB
ALBUMIN SERPL ELPH-MCNC: 4.6 G/DL — SIGNIFICANT CHANGE UP (ref 3.3–5)
ALP SERPL-CCNC: 111 U/L — SIGNIFICANT CHANGE UP (ref 40–120)
ALT FLD-CCNC: 11 U/L — SIGNIFICANT CHANGE UP (ref 4–33)
ANION GAP SERPL CALC-SCNC: 10 MMOL/L — SIGNIFICANT CHANGE UP (ref 7–14)
APPEARANCE UR: CLEAR — SIGNIFICANT CHANGE UP
AST SERPL-CCNC: 15 U/L — SIGNIFICANT CHANGE UP (ref 4–32)
B PERT DNA SPEC QL NAA+PROBE: SIGNIFICANT CHANGE UP
B PERT+PARAPERT DNA PNL SPEC NAA+PROBE: SIGNIFICANT CHANGE UP
BACTERIA # UR AUTO: NEGATIVE — SIGNIFICANT CHANGE UP
BASOPHILS # BLD AUTO: 0.04 K/UL — SIGNIFICANT CHANGE UP (ref 0–0.2)
BASOPHILS NFR BLD AUTO: 0.8 % — SIGNIFICANT CHANGE UP (ref 0–2)
BILIRUB SERPL-MCNC: <0.2 MG/DL — SIGNIFICANT CHANGE UP (ref 0.2–1.2)
BILIRUB UR-MCNC: NEGATIVE — SIGNIFICANT CHANGE UP
BORDETELLA PARAPERTUSSIS (RAPRVP): SIGNIFICANT CHANGE UP
BUN SERPL-MCNC: 7 MG/DL — SIGNIFICANT CHANGE UP (ref 7–23)
C PNEUM DNA SPEC QL NAA+PROBE: SIGNIFICANT CHANGE UP
CALCIUM SERPL-MCNC: 9.7 MG/DL — SIGNIFICANT CHANGE UP (ref 8.4–10.5)
CHLORIDE SERPL-SCNC: 102 MMOL/L — SIGNIFICANT CHANGE UP (ref 98–107)
CO2 SERPL-SCNC: 26 MMOL/L — SIGNIFICANT CHANGE UP (ref 22–31)
COLOR SPEC: YELLOW — SIGNIFICANT CHANGE UP
CREAT SERPL-MCNC: 0.63 MG/DL — SIGNIFICANT CHANGE UP (ref 0.5–1.3)
DIFF PNL FLD: ABNORMAL
EGFR: 119 ML/MIN/1.73M2 — SIGNIFICANT CHANGE UP
EOSINOPHIL # BLD AUTO: 0.07 K/UL — SIGNIFICANT CHANGE UP (ref 0–0.5)
EOSINOPHIL NFR BLD AUTO: 1.4 % — SIGNIFICANT CHANGE UP (ref 0–6)
EPI CELLS # UR: 1 /HPF — SIGNIFICANT CHANGE UP (ref 0–5)
FLUAV SUBTYP SPEC NAA+PROBE: SIGNIFICANT CHANGE UP
FLUBV RNA SPEC QL NAA+PROBE: SIGNIFICANT CHANGE UP
GLUCOSE SERPL-MCNC: 98 MG/DL — SIGNIFICANT CHANGE UP (ref 70–99)
GLUCOSE UR QL: NEGATIVE — SIGNIFICANT CHANGE UP
HADV DNA SPEC QL NAA+PROBE: SIGNIFICANT CHANGE UP
HCG SERPL-ACNC: <5 MIU/ML — SIGNIFICANT CHANGE UP
HCOV 229E RNA SPEC QL NAA+PROBE: SIGNIFICANT CHANGE UP
HCOV HKU1 RNA SPEC QL NAA+PROBE: SIGNIFICANT CHANGE UP
HCOV NL63 RNA SPEC QL NAA+PROBE: SIGNIFICANT CHANGE UP
HCOV OC43 RNA SPEC QL NAA+PROBE: SIGNIFICANT CHANGE UP
HCT VFR BLD CALC: 39.8 % — SIGNIFICANT CHANGE UP (ref 34.5–45)
HGB BLD-MCNC: 13 G/DL — SIGNIFICANT CHANGE UP (ref 11.5–15.5)
HMPV RNA SPEC QL NAA+PROBE: SIGNIFICANT CHANGE UP
HPIV1 RNA SPEC QL NAA+PROBE: SIGNIFICANT CHANGE UP
HPIV2 RNA SPEC QL NAA+PROBE: SIGNIFICANT CHANGE UP
HPIV3 RNA SPEC QL NAA+PROBE: SIGNIFICANT CHANGE UP
HPIV4 RNA SPEC QL NAA+PROBE: SIGNIFICANT CHANGE UP
IANC: 3.32 K/UL — SIGNIFICANT CHANGE UP (ref 1.8–7.4)
IMM GRANULOCYTES NFR BLD AUTO: 0 % — SIGNIFICANT CHANGE UP (ref 0–0.9)
KETONES UR-MCNC: NEGATIVE — SIGNIFICANT CHANGE UP
LEUKOCYTE ESTERASE UR-ACNC: NEGATIVE — SIGNIFICANT CHANGE UP
LYMPHOCYTES # BLD AUTO: 1.38 K/UL — SIGNIFICANT CHANGE UP (ref 1–3.3)
LYMPHOCYTES # BLD AUTO: 26.7 % — SIGNIFICANT CHANGE UP (ref 13–44)
M PNEUMO DNA SPEC QL NAA+PROBE: SIGNIFICANT CHANGE UP
MAGNESIUM SERPL-MCNC: 2 MG/DL — SIGNIFICANT CHANGE UP (ref 1.6–2.6)
MCHC RBC-ENTMCNC: 29 PG — SIGNIFICANT CHANGE UP (ref 27–34)
MCHC RBC-ENTMCNC: 32.7 GM/DL — SIGNIFICANT CHANGE UP (ref 32–36)
MCV RBC AUTO: 88.6 FL — SIGNIFICANT CHANGE UP (ref 80–100)
MONOCYTES # BLD AUTO: 0.36 K/UL — SIGNIFICANT CHANGE UP (ref 0–0.9)
MONOCYTES NFR BLD AUTO: 7 % — SIGNIFICANT CHANGE UP (ref 2–14)
NEUTROPHILS # BLD AUTO: 3.32 K/UL — SIGNIFICANT CHANGE UP (ref 1.8–7.4)
NEUTROPHILS NFR BLD AUTO: 64.1 % — SIGNIFICANT CHANGE UP (ref 43–77)
NITRITE UR-MCNC: NEGATIVE — SIGNIFICANT CHANGE UP
NRBC # BLD: 0 /100 WBCS — SIGNIFICANT CHANGE UP (ref 0–0)
NRBC # FLD: 0 K/UL — SIGNIFICANT CHANGE UP (ref 0–0)
PH UR: 5.5 — SIGNIFICANT CHANGE UP (ref 5–8)
PLATELET # BLD AUTO: 170 K/UL — SIGNIFICANT CHANGE UP (ref 150–400)
POTASSIUM SERPL-MCNC: 4.2 MMOL/L — SIGNIFICANT CHANGE UP (ref 3.5–5.3)
POTASSIUM SERPL-SCNC: 4.2 MMOL/L — SIGNIFICANT CHANGE UP (ref 3.5–5.3)
PROT SERPL-MCNC: 8.4 G/DL — HIGH (ref 6–8.3)
PROT UR-MCNC: NEGATIVE — SIGNIFICANT CHANGE UP
RAPID RVP RESULT: DETECTED
RBC # BLD: 4.49 M/UL — SIGNIFICANT CHANGE UP (ref 3.8–5.2)
RBC # FLD: 14.9 % — HIGH (ref 10.3–14.5)
RBC CASTS # UR COMP ASSIST: 7 /HPF — HIGH (ref 0–4)
RSV RNA SPEC QL NAA+PROBE: SIGNIFICANT CHANGE UP
RV+EV RNA SPEC QL NAA+PROBE: DETECTED
SARS-COV-2 RNA SPEC QL NAA+PROBE: SIGNIFICANT CHANGE UP
SODIUM SERPL-SCNC: 138 MMOL/L — SIGNIFICANT CHANGE UP (ref 135–145)
SP GR SPEC: 1.02 — SIGNIFICANT CHANGE UP (ref 1.01–1.05)
UROBILINOGEN FLD QL: SIGNIFICANT CHANGE UP
WBC # BLD: 5.17 K/UL — SIGNIFICANT CHANGE UP (ref 3.8–10.5)
WBC # FLD AUTO: 5.17 K/UL — SIGNIFICANT CHANGE UP (ref 3.8–10.5)
WBC UR QL: 1 /HPF — SIGNIFICANT CHANGE UP (ref 0–5)

## 2022-11-26 PROCEDURE — 99285 EMERGENCY DEPT VISIT HI MDM: CPT

## 2022-11-26 PROCEDURE — 74176 CT ABD & PELVIS W/O CONTRAST: CPT | Mod: 26,GC,MA

## 2022-11-26 RX ORDER — KETOROLAC TROMETHAMINE 30 MG/ML
15 SYRINGE (ML) INJECTION ONCE
Refills: 0 | Status: DISCONTINUED | OUTPATIENT
Start: 2022-11-26 | End: 2022-11-26

## 2022-11-26 RX ADMIN — Medication 15 MILLIGRAM(S): at 17:45

## 2022-11-26 NOTE — ED PROVIDER NOTE - NS ED ROS FT
CONST: no fevers, no chills, no trauma  EYES: no blurry vision   ENT: no sore throat  CV: no chest pain, no extremity swelling  RESP: no shortness of breath  ABD: no abdominal pain, no nausea, no vomiting, no diarrhea, no melena, L flank pain.  : no dysuria, no hematuria, no frequency  MSK: no back pain, no neck pain, no extremity pain  NEURO: no headache, no focal weakness, no dizziness  HEME: no bruising  SKIN: no rash

## 2022-11-26 NOTE — ED ADULT NURSE NOTE - OBJECTIVE STATEMENT
Patient received in stretcher. AOx4. Respirations even and unlabored. Presents to ER c/o L flank pain x "couple of days" As per patient she was sleeping with her kids in bed when she woke up the next day with the pain. Patient states "I was uncomfortable, so I thought it was because of that" Denies any urinary s/s. IV placed. Labs drawn. Medicated as per MD orders. Comfort and safety maintained. all current care needs met. Care plan continues Chloe QUEEN

## 2022-11-26 NOTE — ED PROVIDER NOTE - PATIENT PORTAL LINK FT
You can access the FollowMyHealth Patient Portal offered by [facility] by registering at the following website: [email or web address]. By joining [employer]’s FollowMyHealth portal, you will also be able to view your health information using other applications (apps) compatible with our system.

## 2022-11-26 NOTE — ED PROVIDER NOTE - NSFOLLOWUPINSTRUCTIONS_ED_ALL_ED_FT
You were seen in the Emergency Department for pain in your left flank. You received a CT scan of your abdomen, which did not show a renal stone or any other acute reasons for your pain. You were positive for enterorhino virus, but you have no symptoms, so this could possibly represent early infection or incidental finding. Your pain improved with pain medication. You can take up to 600 mg of ibuprofen every 6 hours for pain relief.    1) Advance activity as tolerated.   2) Continue all previously prescribed medications as directed.    3) Follow up with your primary care physician in 24-48 hours - take copies of your results.    4) Return to the Emergency Department for worsening or persistent symptoms, and/or ANY NEW OR CONCERNING SYMPTOMS.

## 2022-11-26 NOTE — ED PROVIDER NOTE - PHYSICAL EXAMINATION
Const: not in acute distress, but looks uncomfortable.  Eyes: no conjunctival injection  HEENT: Head NCAT, Moist MM.  Neck: Trachea midline.   CVS: +S1/S2, Peripheral pulses 2+ and equal in all extremities.  RESP: Unlabored respiratory effort. Clear to auscultation bilaterally.  GI: Nontender/Nondistended, No CVA tenderness b/l.   MSK: Normocephalic/Atraumatic, No Lower Extremities edema b/l.   Skin: Intact.   Neuro: CNs II-XII grossly intact. Motor & Sensation grossly intact.  Psych: Awake, Alert, & Oriented (AAO) x3.

## 2022-11-26 NOTE — ED PROVIDER NOTE - OBJECTIVE STATEMENT
33 yo female with no significant PMhx, presenting with L flank pain for last 4 days. Pain initially bilateral lower rib pain, but migrated to L flank. Patient states pain is positional in nature, described as sharp, throbbing, intermittent, not tender to palpation. Patient denies fever, cough, sore throat, CP, SOB, abdominal pain, n/v, and diarrhea. Patient recently gave birth 5 months ago, vaginal delivery, uncomplicated.

## 2022-11-26 NOTE — ED PROVIDER NOTE - PROGRESS NOTE DETAILS
MD Zeina (PGY-1) Positive for enterorhino virus. Asymptomatic overall. Pain controlled with the pain medication. Pain likely 2/2 MSK. Will look to dispo.

## 2022-11-26 NOTE — ED PROVIDER NOTE - CLINICAL SUMMARY MEDICAL DECISION MAKING FREE TEXT BOX
33 yo female with no significant PMhx, presenting with L flank pain for last 4 days. Vitals stable. Concern for nephrolithiasis vs MSK pain. Will get CBC, CMP, flu with COVID, UA/UC, CT non con and treat pain.

## 2022-11-27 LAB
CULTURE RESULTS: NO GROWTH — SIGNIFICANT CHANGE UP
SPECIMEN SOURCE: SIGNIFICANT CHANGE UP

## 2023-10-27 NOTE — DISCHARGE NOTE OB - BREAST MILK PROVIDES PROTECTION AGAINST INFECTION
Physical Therapy Treatment Note           Name: Zuly Hernandez    : 1937 (86 y.o.)  MRN: [id number]           TREATMENT SUMMARY AND RECOMMENDATIONS:    PT Received On: 10/27/23  PT Start Time: 1058     PT Stop Time: 1127  PT Total Time (min): 29 min     Subjective Assessment:   No complaints  Lethargic   x Awake, alert, cooperative  Uncooperative    Agitated x c/o pain --neck and PEG site    Appropriate  c/o fatigue    Confused  Treated at bedside     Emotionally labile x Treated in gym/dept.    Impulsive  Other:    Flat affect       Therapy Precautions:    Cognitive deficits x Spinal precautions    Collar - hard x Sternal precautions   x Collar - soft   TLSO   x Fall risk  LSO    Hip precautions - posterior  Knee immobilizer    Hip precautions - anterior  WBAT    Impaired communication  Partial weightbearing    Oxygen  TTWB   x PEG tube  NWB    Visual deficits  Other:   x Hearing deficits          Treatment Objectives:     Mobility Training:   Assist level Comments    Bed mobility     Transfer     Gait CGA X65ft using RW; step-through gait pattern with discontinuous steps   Sit to stand transitions MIN A From wc level   Sitting balance     Standing balance      Wheelchair mobility     Car transfer     Other:          Therapeutic Exercise:   Exercise Sets Reps Comments   LE cycling   10 minutes F   Pectoralis mm stretch 3 30 sec Performed short sitting                    Additional Comments:  Soft tissue massage performed to posterior thoracic and cervical spinal muscles and upper traps x5 minutes - patient reporting relief following    Assessment: Patient tolerated session fair, limited 2/2 posterior cervical pain and PEG site pain. Both nursing and MD aware.    PT Plan: continue POC  Revisions made to plan of care: No    GOALS:   Multidisciplinary Problems       Physical Therapy Goals          Problem: Physical Therapy    Goal Priority Disciplines Outcome Goal Variances Interventions   Physical  Therapy Goal     PT, PT/OT Ongoing, Progressing     Description: Goals to be met by: Discharge     Patient will increase functional independence with mobility by performin. Supine to sit with Stand-by Assistance  2. Sit to supine with Stand-by Assistance  3. Sit to stand transfer with Stand-by Assistance  4. Bed to chair transfer with Stand-by Assistance using Rolling Walker  5. Gait  x 50 feet with Contact Guard Assistance using Rolling Walker.     New goal:   6. Family members and/or sitters will demonstrate [place] and safety with assisting patient with all functional mobility                         Skilled PT Minutes Provided: 29 minutes   Communication with Treatment Team:     Equipment recommendations:       At end of treatment, patient remained:  x Up in chair     Up in wheelchair in room    In bed   x With alarm activated    Bed rails up   x Call bell in reach    x Family/friends present    Restraints secured properly    In bathroom with CNA/RN notified    Nurse aware    In gym with therapist/tech    Other:      Statement Selected

## 2024-05-04 ENCOUNTER — EMERGENCY (EMERGENCY)
Facility: HOSPITAL | Age: 34
LOS: 1 days | Discharge: HOME | End: 2024-05-04
Attending: EMERGENCY MEDICINE | Admitting: EMERGENCY MEDICINE
Payer: COMMERCIAL

## 2024-05-04 VITALS
DIASTOLIC BLOOD PRESSURE: 78 MMHG | TEMPERATURE: 99 F | WEIGHT: 134.92 LBS | SYSTOLIC BLOOD PRESSURE: 131 MMHG | RESPIRATION RATE: 20 BRPM | HEART RATE: 87 BPM | HEIGHT: 63 IN | OXYGEN SATURATION: 99 %

## 2024-05-04 VITALS
TEMPERATURE: 99 F | RESPIRATION RATE: 16 BRPM | DIASTOLIC BLOOD PRESSURE: 67 MMHG | HEART RATE: 71 BPM | SYSTOLIC BLOOD PRESSURE: 121 MMHG | OXYGEN SATURATION: 99 %

## 2024-05-04 PROBLEM — Z78.9 OTHER SPECIFIED HEALTH STATUS: Chronic | Status: ACTIVE | Noted: 2022-11-26

## 2024-05-04 LAB
ADD ON TEST-SPECIMEN IN LAB: SIGNIFICANT CHANGE UP
ANION GAP SERPL CALC-SCNC: 10 MMOL/L — SIGNIFICANT CHANGE UP (ref 5–17)
APPEARANCE UR: CLEAR — SIGNIFICANT CHANGE UP
BACTERIA # UR AUTO: NEGATIVE /HPF — SIGNIFICANT CHANGE UP
BASOPHILS # BLD AUTO: 0.03 K/UL — SIGNIFICANT CHANGE UP (ref 0–0.2)
BASOPHILS NFR BLD AUTO: 0.6 % — SIGNIFICANT CHANGE UP (ref 0–2)
BILIRUB UR-MCNC: NEGATIVE — SIGNIFICANT CHANGE UP
BUN SERPL-MCNC: 7 MG/DL — SIGNIFICANT CHANGE UP (ref 7–23)
CALCIUM SERPL-MCNC: 9.9 MG/DL — SIGNIFICANT CHANGE UP (ref 8.4–10.5)
CHLORIDE SERPL-SCNC: 102 MMOL/L — SIGNIFICANT CHANGE UP (ref 96–108)
CO2 SERPL-SCNC: 26 MMOL/L — SIGNIFICANT CHANGE UP (ref 22–31)
COLOR SPEC: YELLOW — SIGNIFICANT CHANGE UP
CREAT SERPL-MCNC: 0.69 MG/DL — SIGNIFICANT CHANGE UP (ref 0.5–1.3)
DIFF PNL FLD: ABNORMAL
EGFR: 116 ML/MIN/1.73M2 — SIGNIFICANT CHANGE UP
EOSINOPHIL # BLD AUTO: 0.24 K/UL — SIGNIFICANT CHANGE UP (ref 0–0.5)
EOSINOPHIL NFR BLD AUTO: 4.9 % — SIGNIFICANT CHANGE UP (ref 0–6)
GLUCOSE SERPL-MCNC: 107 MG/DL — HIGH (ref 70–99)
GLUCOSE UR QL: NEGATIVE MG/DL — SIGNIFICANT CHANGE UP
HCG SERPL-ACNC: 1168 MIU/ML — HIGH
HCT VFR BLD CALC: 42 % — SIGNIFICANT CHANGE UP (ref 34.5–45)
HGB BLD-MCNC: 14.1 G/DL — SIGNIFICANT CHANGE UP (ref 11.5–15.5)
IMM GRANULOCYTES NFR BLD AUTO: 0.2 % — SIGNIFICANT CHANGE UP (ref 0–0.9)
KETONES UR-MCNC: NEGATIVE MG/DL — SIGNIFICANT CHANGE UP
LEUKOCYTE ESTERASE UR-ACNC: NEGATIVE — SIGNIFICANT CHANGE UP
LYMPHOCYTES # BLD AUTO: 1.77 K/UL — SIGNIFICANT CHANGE UP (ref 1–3.3)
LYMPHOCYTES # BLD AUTO: 36 % — SIGNIFICANT CHANGE UP (ref 13–44)
MCHC RBC-ENTMCNC: 30.7 PG — SIGNIFICANT CHANGE UP (ref 27–34)
MCHC RBC-ENTMCNC: 33.6 GM/DL — SIGNIFICANT CHANGE UP (ref 32–36)
MCV RBC AUTO: 91.3 FL — SIGNIFICANT CHANGE UP (ref 80–100)
MONOCYTES # BLD AUTO: 0.23 K/UL — SIGNIFICANT CHANGE UP (ref 0–0.9)
MONOCYTES NFR BLD AUTO: 4.7 % — SIGNIFICANT CHANGE UP (ref 2–14)
NEUTROPHILS # BLD AUTO: 2.64 K/UL — SIGNIFICANT CHANGE UP (ref 1.8–7.4)
NEUTROPHILS NFR BLD AUTO: 53.6 % — SIGNIFICANT CHANGE UP (ref 43–77)
NITRITE UR-MCNC: NEGATIVE — SIGNIFICANT CHANGE UP
NRBC # BLD: 0 /100 WBCS — SIGNIFICANT CHANGE UP (ref 0–0)
PH UR: 7 — SIGNIFICANT CHANGE UP (ref 5–8)
PLATELET # BLD AUTO: 202 K/UL — SIGNIFICANT CHANGE UP (ref 150–400)
POTASSIUM SERPL-MCNC: 3.7 MMOL/L — SIGNIFICANT CHANGE UP (ref 3.5–5.3)
POTASSIUM SERPL-SCNC: 3.7 MMOL/L — SIGNIFICANT CHANGE UP (ref 3.5–5.3)
PROT UR-MCNC: NEGATIVE MG/DL — SIGNIFICANT CHANGE UP
RBC # BLD: 4.6 M/UL — SIGNIFICANT CHANGE UP (ref 3.8–5.2)
RBC # FLD: 12.9 % — SIGNIFICANT CHANGE UP (ref 10.3–14.5)
RBC CASTS # UR COMP ASSIST: 3 /HPF — SIGNIFICANT CHANGE UP (ref 0–4)
SODIUM SERPL-SCNC: 138 MMOL/L — SIGNIFICANT CHANGE UP (ref 135–145)
SP GR SPEC: 1.01 — SIGNIFICANT CHANGE UP (ref 1–1.03)
SQUAMOUS # UR AUTO: 2 /HPF — SIGNIFICANT CHANGE UP (ref 0–5)
UROBILINOGEN FLD QL: 0.2 MG/DL — SIGNIFICANT CHANGE UP (ref 0.2–1)
WBC # BLD: 4.92 K/UL — SIGNIFICANT CHANGE UP (ref 3.8–10.5)
WBC # FLD AUTO: 4.92 K/UL — SIGNIFICANT CHANGE UP (ref 3.8–10.5)
WBC UR QL: 0 /HPF — SIGNIFICANT CHANGE UP (ref 0–5)

## 2024-05-04 PROCEDURE — 80076 HEPATIC FUNCTION PANEL: CPT

## 2024-05-04 PROCEDURE — 81001 URINALYSIS AUTO W/SCOPE: CPT

## 2024-05-04 PROCEDURE — 86850 RBC ANTIBODY SCREEN: CPT

## 2024-05-04 PROCEDURE — 76801 OB US < 14 WKS SINGLE FETUS: CPT

## 2024-05-04 PROCEDURE — 36415 COLL VENOUS BLD VENIPUNCTURE: CPT

## 2024-05-04 PROCEDURE — 80048 BASIC METABOLIC PNL TOTAL CA: CPT

## 2024-05-04 PROCEDURE — 96372 THER/PROPH/DIAG INJ SC/IM: CPT

## 2024-05-04 PROCEDURE — 86901 BLOOD TYPING SEROLOGIC RH(D): CPT

## 2024-05-04 PROCEDURE — 99285 EMERGENCY DEPT VISIT HI MDM: CPT

## 2024-05-04 PROCEDURE — 76801 OB US < 14 WKS SINGLE FETUS: CPT | Mod: 26

## 2024-05-04 PROCEDURE — 84702 CHORIONIC GONADOTROPIN TEST: CPT

## 2024-05-04 PROCEDURE — 85025 COMPLETE CBC W/AUTO DIFF WBC: CPT

## 2024-05-04 PROCEDURE — 76817 TRANSVAGINAL US OBSTETRIC: CPT | Mod: 26

## 2024-05-04 PROCEDURE — 76817 TRANSVAGINAL US OBSTETRIC: CPT

## 2024-05-04 PROCEDURE — 99284 EMERGENCY DEPT VISIT MOD MDM: CPT | Mod: 25

## 2024-05-04 PROCEDURE — 86900 BLOOD TYPING SEROLOGIC ABO: CPT

## 2024-05-04 RX ORDER — METHOTREXATE 2.5 MG/1
82 TABLET ORAL ONCE
Refills: 0 | Status: COMPLETED | OUTPATIENT
Start: 2024-05-04 | End: 2024-05-04

## 2024-05-04 RX ADMIN — METHOTREXATE 82 MILLIGRAM(S): 2.5 TABLET ORAL at 19:42

## 2024-05-04 NOTE — CONSULT NOTE ADULT - ASSESSMENT
33 y.o.  at 5+1 by LMP 3/29 presenting with newly diagnosed left adnexal ectopic found on u/s today when she went for termination of pregnancy,  - Pt is clinically and hemodynamically stable given normal vitals and Hgb 14.1  - Pt is likely not ruptured at this time given she has normal vitals, no complaints, benign physical exam and completely normal labs, and no free fluid on TVUS.  - Pt counseled on her options for treatment of suspected ectopic and decision made to proceed with MTX one dose protocol for management. Medical management is appropriate for this pt at this time rather than surgical management.  - Risks of MTX including but not limited to GI complaints, possible rupture of ectopic, possible need for re-dose, and possible need for surgery discussed. Risk of surgery including infection, bleeding, damage to adjacent organs, scarring with salpingostomy, and possible removal of fallopian tube. She is a candidate for Methotrexate tx. Methotrexate ___ mg IM once into buttock. Pt does not have contraindications to MTX including blood dyscrasia, active pulmonary disease, hepatic/renal disease, or hematologic dysfunction  - Pt was counseled on avoiding folic acid containing foods, prenatal vitamins, alcohol, breastfeeding, as well as intercourse. -Pt understands she cannot attempt to conceive for at least 3 months.  - Pt to follow up for day 4 beta on   for repeat bHCG check and then on 5/10 for day 7 beta to see if there is a drop of 15% that is adequate or if pt will require an additional dose of MTX.  - Pt counseled on strict return precautions such as severe abdominal pain, heavy bleeding soaking more than 1 pad/hr for two hours or any symptoms of anemia.    D/w: Dr. Santoyo and Dr. Dominguez         33 y.o.  at 5+1 by LMP 3/29 presenting with newly diagnosed left adnexal ectopic found on u/s today when she went for termination of pregnancy,  - Pt is clinically and hemodynamically stable given normal vitals and Hgb 14.1  - Pt is likely not ruptured at this time given she has normal vitals, no complaints, benign physical exam and completely normal labs, and no free fluid on TVUS.  - Pt counseled on her options for treatment of suspected ectopic and decision made to proceed with MTX one dose protocol for management. Medical management is appropriate for this pt at this time rather than surgical management.  - Risks of MTX including but not limited to GI complaints, possible rupture of ectopic, possible need for re-dose, and possible need for surgery discussed. Risk of surgery including infection, bleeding, damage to adjacent organs, scarring with salpingostomy, and possible removal of fallopian tube. She is a candidate for Methotrexate tx. Methotrexate 82 mg IM once into buttock. Pt does not have contraindications to MTX including blood dyscrasia, active pulmonary disease, hepatic/renal disease, or hematologic dysfunction  - Pt was counseled on avoiding folic acid containing foods, prenatal vitamins, alcohol, breastfeeding, as well as intercourse. Pt is currently breastfeeding and endorses she understands she would need to stop if she receives methotrexate Pt also  understands she cannot attempt to conceive for at least 3 months.  - Pt to follow up for day 4 beta on   for repeat bHCG check and then on 5/10 for day 7 beta to see if there is a drop of 15% that is adequate or if pt will require an additional dose of MTX.  - Pt counseled on strict return precautions such as severe abdominal pain, heavy bleeding soaking more than 1 pad/hr for two hours or any symptoms of anemia.    D/w: Dr. Santoyo and Dr. Dominguez         33 y.o.  at 5+1 by LMP 3/29 presenting with newly diagnosed left adnexal ectopic found on u/s today when she went for termination of pregnancy,  - Pt is clinically and hemodynamically stable given normal vitals and Hgb 14.1  - Pt is likely not ruptured at this time given she has normal vitals, no complaints, benign physical exam and completely normal labs, and no free fluid on TVUS.  - Pt counseled on her options for treatment of suspected ectopic and decision made to proceed with MTX one dose protocol for management. Medical management is appropriate for this pt at this time rather than surgical management.  - Risks of MTX including but not limited to GI complaints, possible rupture of ectopic, possible need for re-dose, and possible need for surgery discussed. Risk of surgery including infection, bleeding, damage to adjacent organs, scarring with salpingostomy, and possible removal of fallopian tube. She is a candidate for Methotrexate tx. Methotrexate 82 mg IM once into buttock. Pt does not have contraindications to MTX including blood dyscrasia, active pulmonary disease, hepatic/renal disease, or hematologic dysfunction  - Pt was counseled on avoiding folic acid containing foods, prenatal vitamins, alcohol, breastfeeding, as well as intercourse. Pt is currently breastfeeding and endorses she understands she would need to stop if she receives methotrexate Pt also  understands she cannot attempt to conceive for at least 3 months.  - Pt to follow up for day 4 beta on   for repeat bHCG check and then on 5/10 for day 7 beta to see if there is a drop of 15% that is adequate or if pt will require an additional dose of MTX.  - Pt counseled on strict return precautions such as severe abdominal pain, heavy bleeding soaking more than 1 pad/hr for two hours or any symptoms of anemia.    D/w: Dr. Santoyo and Dr. Barba

## 2024-05-04 NOTE — ED PROVIDER NOTE - CARE PROVIDER_API CALL
Alex Barba  Obstetrics and Gynecology  [address] [place] [address], [address]  [place] [address]  Phone: [phone]  Fax: [phone]  Follow Up Time:

## 2024-05-04 NOTE — CONSULT NOTE ADULT - SUBJECTIVE AND OBJECTIVE BOX
33 y.o.  at 5+1 by LMP 3/29 presenting with newly diagnosed left adnexal ectopic found on u/s today when she went for termination of pregnancy, asymptomatic  Pt denies fever, chills, chest pain, SOB, abdominal pain, nausea, vomiting, vaginal bleeding    OBHx: LMP 3/29  GYNHX: denies STD/STI, cysts, fibroids, abnormal pap  PMHx:   SHx:   Meds:   Allergies:   Social hx:     PHYSICAL EXAM:   Vital Signs Last 24 Hrs  T(C): 37.3 (04 May 2024 13:45), Max: 37.3 (04 May 2024 13:45)  T(F): 99.1 (04 May 2024 13:45), Max: 99.1 (04 May 2024 13:45)  HR: 87 (04 May 2024 13:45) (87 - 87)  BP: 131/78 (04 May 2024 13:45) (131/78 - 131/78)  BP(mean): --  RR: 20 (04 May 2024 13:45) (20 - 20)  SpO2: 99% (04 May 2024 13:45) (99% - 99%)    Parameters below as of 04 May 2024 13:45  Patient On (Oxygen Delivery Method): room air        **************************  Constitutional: Alert & Oriented x3, No acute distress  Respiratory: Clear to ausculation bilaterally; no wheezing, rhonchi, or crackles  Cardiovascular: regular rate and rhythm, no murmurs, or gallops  Gastrointestinal: soft, non tender, positive bowel sounds, no rebound or guarding   Pelvic exam:   Extremities: no calf tenderness or swelling    LABS:                        14.1   4.92  )-----------(       ( 04 May 2024 14:13 )             42.0     05-04    138  |  102  |  7   ----------------------------<  107<H>  3.7   |  26  |  0.69    Ca    9.9      04 May 2024 14:13        Urinalysis Basic - ( 04 May 2024 14:13 )    Color: Yellow / Appearance: Clear / S.014 / pH: x  Gluc: 107 mg/dL / Ketone: Negative mg/dL  / Bili: Negative / Urobili: 0.2 mg/dL   Blood: x / Protein: Negative mg/dL / Nitrite: Negative   Leuk Esterase: Negative / RBC: 3 /HPF / WBC 0 /HPF   Sq Epi: x / Non Sq Epi: 2 /HPF / Bacteria: Negative /HPF      HCG Quantitative, Serum: 1168 mIU/mL ( @ 14:13)      RADIOLOGY & ADDITIONAL STUDIES:  TVUS :    Uterus: 7 x 4.3 x 4.8 cm. Endometrial thickness 1.0 cm. No evidence of   intrauterine pregnancy.    Right ovary: 2.6 cm x 2.6 cm x 2.0 cm. Within normal limits. Normal   arterial and venous waveforms.  Left ovary: 2.3 cm x 1.1 cm x 1.4 cm. Within normal limits. Normal   arterial and venous waveforms.  Left adnexal cystic structure with thick echogenic avascular wall 1.1 x   1.2 x 1.1 cm.    Fluid: None.    IMPRESSION:  Findings compatible with left adnexal ectopic.  No evidence of intrauterine pregnancy.       33 y.o.  at 5+1 by LMP 3/29 presenting with newly diagnosed left adnexal ectopic found on u/s today when she went for termination of pregnancy at a clinic. Pt had her first positive pregnancy test one week ago. She called her obgyn Dr. Barba and told him it was an undesired pregnancy and she was referred to an outpatient center. At this center, blood work and imaging was performed adn the ectopic pregnancy was identified. Pt has been completely asymptomaticPt denies fever, chills, chest pain, SOB, abdominal pain, nausea, vomiting, vaginal bleeding.    OBHx: LMP 3/29  GYNHX: denies STD/STI, cysts, fibroids, abnormal pap  PMHx: denies  SHx: denies  Meds: denies  Allergies: denies      PHYSICAL EXAM:   Vital Signs Last 24 Hrs  T(C): 37.3 (04 May 2024 13:45), Max: 37.3 (04 May 2024 13:45)  T(F): 99.1 (04 May 2024 13:45), Max: 99.1 (04 May 2024 13:45)  HR: 87 (04 May 2024 13:45) (87 - 87)  BP: 131/78 (04 May 2024 13:45) (131/78 - 131/78)  BP(mean): --  RR: 20 (04 May 2024 13:45) (20 - 20)  SpO2: 99% (04 May 2024 13:45) (99% - 99%)    Parameters below as of 04 May 2024 13:45  Patient On (Oxygen Delivery Method): room air        **************************  Constitutional: Alert & Oriented x3, No acute distress  Respiratory: Breathing comfortably on room air  Gastrointestinal: soft, non tender, positive bowel sounds, no rebound or guarding   Pelvic exam: no adnexal tenderness on bimanual exam.  Extremities: no calf tenderness or swelling    LABS:                        14.1   4.92  )-----------(       ( 04 May 2024 14:13 )             42.0     05-04    138  |  102  |  7   ----------------------------<  107<H>  3.7   |  26  |  0.69    Ca    9.9      04 May 2024 14:13        Urinalysis Basic - ( 04 May 2024 14:13 )    Color: Yellow / Appearance: Clear / S.014 / pH: x  Gluc: 107 mg/dL / Ketone: Negative mg/dL  / Bili: Negative / Urobili: 0.2 mg/dL   Blood: x / Protein: Negative mg/dL / Nitrite: Negative   Leuk Esterase: Negative / RBC: 3 /HPF / WBC 0 /HPF   Sq Epi: x / Non Sq Epi: 2 /HPF / Bacteria: Negative /HPF      HCG Quantitative, Serum: 1168 mIU/mL ( @ 14:13)      RADIOLOGY & ADDITIONAL STUDIES:  TVUS :    Uterus: 7 x 4.3 x 4.8 cm. Endometrial thickness 1.0 cm. No evidence of   intrauterine pregnancy.    Right ovary: 2.6 cm x 2.6 cm x 2.0 cm. Within normal limits. Normal   arterial and venous waveforms.  Left ovary: 2.3 cm x 1.1 cm x 1.4 cm. Within normal limits. Normal   arterial and venous waveforms.  Left adnexal cystic structure with thick echogenic avascular wall 1.1 x   1.2 x 1.1 cm.    Fluid: None.    IMPRESSION:  Findings compatible with left adnexal ectopic.  No evidence of intrauterine pregnancy.

## 2024-05-04 NOTE — ED PROVIDER NOTE - NSFOLLOWUPINSTRUCTIONS_ED_ALL_ED_FT
Please follow up with your gyn on 5/7/24 and 5/10/24 for re-evaluation and repeat blood work.   Return to er for any new or worsening symptoms (abdominal pain, vaginal bleeding, dizziness, weakness, passing out, difficulty breathing...).    Ectopic Pregnancy    AMBULATORY CARE:    Ectopic pregnancy occurs when a fertilized egg attaches and begins to grow outside of the uterus. The most common place for this to happen is in the fallopian tube. This is sometimes called a tubal pregnancy. The egg can also implant on the outside of the uterus, on the ovary or cervix, or in the abdomen. The egg may begin to grow, but the pregnancy cannot continue normally. Ectopic pregnancy can cause heavy bleeding and may be life-threatening.    Common signs and symptoms:    One-sided abdominal or pelvic pain and cramping    Vaginal bleeding or spotting that happens about 7 weeks after your missed period    Nausea or vomiting    Dizziness, weakness, or fainting    Tissue coming out of your vagina  Call your local emergency number (911 in the ) if:    You have chest pain or trouble breathing.    Call your doctor if:    You have sharp pain in your lower abdomen that is severe and starts suddenly.    You feel lightheaded or like you are going to faint.    You have increasing abdominal or pelvic pain or heavy vaginal bleeding.    You have shoulder pain.    You have a fever.    You have questions or concerns about your condition or care.  Treatment may not be needed. Your body may absorb the pregnancy tissues and your symptoms may decrease without any treatment. If this does not happen, you may need any of the following:    Medicine called methotrexate may be given to stop the pregnancy. This may be given as an injection. You may need more than one dose. It is important to follow up with your healthcare provider as directed if you receive this medicine.    Surgery may be done to repair or remove tissue or ruptured fallopian tubes. Your healthcare provider will talk to you about possible kinds of surgery. Your provider will consider where the ectopic pregnancy is located and the damage it caused. Talk to your provider about your desire to have children in the future. Some kinds of surgery will prevent future pregnancy.  If you received methotrexate:    Do not have sex, and limit physical activity. Heavy physical activity or sexual intercourse may cause the ectopic pregnancy to rupture. This can be life-threatening. Your healthcare provider will tell you when it is okay to have sex and return to your normal activities.    Do not get pregnant until your healthcare provider says it is okay. Methotrexate will be harmful to an unborn baby. You will need to wait until at least 1 monthly cycle after you finish the methotrexate course to get pregnant. Your provider may want you to wait until after you have had 3 monthly cycles. This will help make sure all the methotrexate is out of your body.    Avoid folic acid and NSAID medicines. Folic acid, and NSAIDs, such as ibuprofen, prevent methotrexate from working correctly. Do not take folic acid supplements or have foods that are high in folic acid. Examples include orange juice, breakfast cereal, and leafy green vegetables. Your healthcare provider can give you more information on foods to avoid.    You may have some spotting and abdominal pain. This may start a few days after you begin taking methotrexate. These are normal and should only last a short time. Talk to your healthcare provider if you have any concerns.    Limit sunlight exposure. Sunlight can cause a condition caused methotrexate dermatitis (skin irritation).  For support and more information:    The [employer]  [address] [address]  [place] [address]  Phone: [phone]  Phone: [phone]  Web Address: http://www.acog.org  Follow up with your gynecologist as directed: You may need to return for a follow-up exam, treatment, or blood tests. If you received methotrexate to stop your pregnancy, it is important to come in for follow-up tests. Write down your questions so you remember to ask them during your visits.    © [employer] 1973, 2024

## 2024-05-04 NOTE — ED ADULT NURSE NOTE - OBJECTIVE STATEMENT
Pt is a 32yo female presenting to ED c/o pregnancy. Pt , states, "This is my third pregnancy, I went to a doctor for termination of this pregnancy when my doctor told me I have an ectopic pregnancy on ultrasound and was referred to the ER." Pt is A&Ox4, breathing even and unlabored, denies c/p, sob, f/c, abdominal/back pain, dysuria. Pt is a 34yo female presenting to ED c/o pregnancy. Pt , states, "This is my third pregnancy, I went to a doctor for termination of this pregnancy when my doctor told me I have an ectopic pregnancy on ultrasound and was referred to the ER." Pt is A&Ox4, breathing even and unlabored, denies c/p, sob, f/c, abdominal/back pain, dysuria, vaginal bleeding.

## 2024-05-04 NOTE — ED PROVIDER NOTE - CLINICAL SUMMARY MEDICAL DECISION MAKING FREE TEXT BOX
Impression: 32yo f, , LMP 3/29, who was referred to ed for findings of left sided ectopic on outpt us today. Pt is denying any complaints and feeling well. No abd pain, vag bleeding, abnl dc, dysuria, hematuria, flank pain, fever, chills, cp, sob, dizziness, weakness... Afebrile HDS. Pt is well appearing. Abd exam benign. Will check labs including beta quant, obtain pelvic us to confirm/ deny ectopic. Will continue to monitor. Impression: 32yo f, , LMP 3/29, who was referred to ed for findings of left sided ectopic on outpt us today. Pt is denying any complaints and feeling well. No abd pain, vag bleeding, abnl dc, dysuria, hematuria, flank pain, fever, chills, cp, sob, dizziness, weakness... Afebrile HDS. Pt is well appearing. Abd exam benign. Will check labs including beta quant, obtain pelvic us to confirm/ deny ectopic. Will continue to monitor.    Beta quant 1168. Hg/hct wnl. Pelvic us + for left adnexal ectopic with no free fluid. Gyn consult obtained and options discussed. Pt given methotrexate without issue. ED evaluation and management discussed with the patient in detail.  Pt advised on close gyn follow up on  and 5/10 for rpt beta hcg testing. Strict ED return instructions discussed in detail and patient given the opportunity to ask any questions about their discharge diagnosis and instructions. Patient verbalized understanding.

## 2024-05-04 NOTE — ED ADULT TRIAGE NOTE - TEMPERATURE IN CELSIUS (DEGREES C)
Quality 110: Preventive Care And Screening: Influenza Immunization: Influenza immunization was not ordered or administered, reason not given Detail Level: Detailed Quality 111:Pneumonia Vaccination Status For Older Adults: Pneumococcal Vaccination not Administered or Previously Received, Reason not Otherwise Specified 37.3

## 2024-05-04 NOTE — ED ADULT NURSE NOTE - NSFALLUNIVINTERV_ED_ALL_ED
Bed/Stretcher in lowest position, wheels locked, appropriate side rails in place/Call bell, personal items and telephone in reach/Instruct patient to call for assistance before getting out of bed/chair/stretcher/Non-slip footwear applied when patient is off stretcher/[place] to call system/Physically safe environment - no spills, clutter or unnecessary equipment/Purposeful proactive rounding/Room/bathroom lighting operational, light cord in reach

## 2024-05-04 NOTE — ED PROVIDER NOTE - OBJECTIVE STATEMENT
Pt is a 34yo f, , LMP 3/29, who was referred to ed for findings of left sided ectopic on outpt us today. Pt is denying any complaints and feeling well. No abd pain, vag bleeding, abnl dc, dysuria, hematuria, flank pain, fever, chills, cp, sob, dizziness, weakness...

## 2024-05-04 NOTE — ED PROVIDER NOTE - PATIENT PORTAL LINK FT
You can access the FollowMyHealth Patient Portal offered by [facility] by registering at the following website: [email or web address]. By joining [email or web address]’s FollowMyHealth portal, you will also be able to view your health information using other applications (apps) compatible with our system.

## 2024-05-04 NOTE — ED ADULT TRIAGE NOTE - CHIEF COMPLAINT QUOTE
Pt presents to ED sent in by GYN to r/o ectopic pregnancy. Denies cramping, vaginal bleeding. . Pt A&Ox4, NAD.

## 2024-05-06 LAB
BLD GP AB SCN SERPL QL: NEGATIVE — SIGNIFICANT CHANGE UP
RH IG SCN BLD-IMP: POSITIVE — SIGNIFICANT CHANGE UP

## 2024-05-06 NOTE — CHART NOTE - NSCHARTNOTEFT_GEN_A_CORE
Called pt to remind her of follow up tomorrow for day 4 beta HCG. Pt states she is aware and is following up in Dr. Barba's office. Pt reports feeling well, denies pain,a vaginal bleeding, CP, SOB, and lightheadedness.

## 2024-05-07 DIAGNOSIS — O00.90 UNSPECIFIED ECTOPIC PREGNANCY WITHOUT INTRAUTERINE PREGNANCY: ICD-10-CM

## 2024-05-08 NOTE — CHART NOTE - NSCHARTNOTEFT_GEN_A_CORE
Spoke with Dr Barba who states patient followed up with him Day 4 [facility] yesterday 5/7.  Pt was doing well and will continue to follow with Dr Barba in the office. Per Dr Barba he will manage pt from here and pt will be sent  back to the ER if need be. GYN inpatient signing off.

## 2024-08-25 ENCOUNTER — TRANSCRIPTION ENCOUNTER (OUTPATIENT)
Age: 34
End: 2024-08-25

## 2024-08-26 ENCOUNTER — TRANSCRIPTION ENCOUNTER (OUTPATIENT)
Age: 34
End: 2024-08-26

## 2024-08-26 ENCOUNTER — INPATIENT (INPATIENT)
Facility: HOSPITAL | Age: 34
LOS: 0 days | Discharge: HOME | DRG: 819 | End: 2024-08-27
Attending: OBSTETRICS & GYNECOLOGY | Admitting: OBSTETRICS & GYNECOLOGY
Payer: COMMERCIAL

## 2024-08-26 VITALS
SYSTOLIC BLOOD PRESSURE: 108 MMHG | OXYGEN SATURATION: 100 % | TEMPERATURE: 99 F | DIASTOLIC BLOOD PRESSURE: 72 MMHG | HEIGHT: 63 IN | RESPIRATION RATE: 18 BRPM | HEART RATE: 67 BPM | WEIGHT: 130.07 LBS

## 2024-08-26 LAB
ANION GAP SERPL CALC-SCNC: 8 MMOL/L — SIGNIFICANT CHANGE UP (ref 5–17)
APPEARANCE UR: CLEAR — SIGNIFICANT CHANGE UP
BACTERIA # UR AUTO: NEGATIVE /HPF — SIGNIFICANT CHANGE UP
BASOPHILS # BLD AUTO: 0.04 K/UL — SIGNIFICANT CHANGE UP (ref 0–0.2)
BASOPHILS NFR BLD AUTO: 1 % — SIGNIFICANT CHANGE UP (ref 0–2)
BILIRUB UR-MCNC: NEGATIVE — SIGNIFICANT CHANGE UP
BLD GP AB SCN SERPL QL: NEGATIVE — SIGNIFICANT CHANGE UP
BUN SERPL-MCNC: 7 MG/DL — SIGNIFICANT CHANGE UP (ref 7–23)
CALCIUM SERPL-MCNC: 9.4 MG/DL — SIGNIFICANT CHANGE UP (ref 8.4–10.5)
CAST: 0 /LPF — SIGNIFICANT CHANGE UP (ref 0–4)
CHLORIDE SERPL-SCNC: 107 MMOL/L — SIGNIFICANT CHANGE UP (ref 96–108)
CO2 SERPL-SCNC: 27 MMOL/L — SIGNIFICANT CHANGE UP (ref 22–31)
COLOR SPEC: YELLOW — SIGNIFICANT CHANGE UP
CREAT SERPL-MCNC: 0.67 MG/DL — SIGNIFICANT CHANGE UP (ref 0.5–1.3)
DIFF PNL FLD: ABNORMAL
EGFR: 116 ML/MIN/1.73M2 — SIGNIFICANT CHANGE UP
EOSINOPHIL # BLD AUTO: 0.12 K/UL — SIGNIFICANT CHANGE UP (ref 0–0.5)
EOSINOPHIL NFR BLD AUTO: 2.9 % — SIGNIFICANT CHANGE UP (ref 0–6)
GLUCOSE SERPL-MCNC: 97 MG/DL — SIGNIFICANT CHANGE UP (ref 70–99)
GLUCOSE UR QL: NEGATIVE MG/DL — SIGNIFICANT CHANGE UP
HCG SERPL-ACNC: HIGH MIU/ML
HCT VFR BLD CALC: 37.9 % — SIGNIFICANT CHANGE UP (ref 34.5–45)
HGB BLD-MCNC: 12.6 G/DL — SIGNIFICANT CHANGE UP (ref 11.5–15.5)
IMM GRANULOCYTES NFR BLD AUTO: 0.2 % — SIGNIFICANT CHANGE UP (ref 0–0.9)
KETONES UR-MCNC: NEGATIVE MG/DL — SIGNIFICANT CHANGE UP
LEUKOCYTE ESTERASE UR-ACNC: NEGATIVE — SIGNIFICANT CHANGE UP
LYMPHOCYTES # BLD AUTO: 1.78 K/UL — SIGNIFICANT CHANGE UP (ref 1–3.3)
LYMPHOCYTES # BLD AUTO: 43.7 % — SIGNIFICANT CHANGE UP (ref 13–44)
MCHC RBC-ENTMCNC: 30.1 PG — SIGNIFICANT CHANGE UP (ref 27–34)
MCHC RBC-ENTMCNC: 33.2 GM/DL — SIGNIFICANT CHANGE UP (ref 32–36)
MCV RBC AUTO: 90.7 FL — SIGNIFICANT CHANGE UP (ref 80–100)
MONOCYTES # BLD AUTO: 0.32 K/UL — SIGNIFICANT CHANGE UP (ref 0–0.9)
MONOCYTES NFR BLD AUTO: 7.9 % — SIGNIFICANT CHANGE UP (ref 2–14)
NEUTROPHILS # BLD AUTO: 1.8 K/UL — SIGNIFICANT CHANGE UP (ref 1.8–7.4)
NEUTROPHILS NFR BLD AUTO: 44.3 % — SIGNIFICANT CHANGE UP (ref 43–77)
NITRITE UR-MCNC: NEGATIVE — SIGNIFICANT CHANGE UP
NRBC # BLD: 0 /100 WBCS — SIGNIFICANT CHANGE UP (ref 0–0)
PH UR: 7.5 — SIGNIFICANT CHANGE UP (ref 5–8)
PLATELET # BLD AUTO: 184 K/UL — SIGNIFICANT CHANGE UP (ref 150–400)
POTASSIUM SERPL-MCNC: 4.1 MMOL/L — SIGNIFICANT CHANGE UP (ref 3.5–5.3)
POTASSIUM SERPL-SCNC: 4.1 MMOL/L — SIGNIFICANT CHANGE UP (ref 3.5–5.3)
PROT UR-MCNC: NEGATIVE MG/DL — SIGNIFICANT CHANGE UP
RBC # BLD: 4.18 M/UL — SIGNIFICANT CHANGE UP (ref 3.8–5.2)
RBC # FLD: 12.2 % — SIGNIFICANT CHANGE UP (ref 10.3–14.5)
RBC CASTS # UR COMP ASSIST: 2 /HPF — SIGNIFICANT CHANGE UP (ref 0–4)
RH IG SCN BLD-IMP: POSITIVE — SIGNIFICANT CHANGE UP
SODIUM SERPL-SCNC: 142 MMOL/L — SIGNIFICANT CHANGE UP (ref 135–145)
SP GR SPEC: 1.01 — SIGNIFICANT CHANGE UP (ref 1–1.03)
SQUAMOUS # UR AUTO: 1 /HPF — SIGNIFICANT CHANGE UP (ref 0–5)
UROBILINOGEN FLD QL: 0.2 MG/DL — SIGNIFICANT CHANGE UP (ref 0.2–1)
WBC # BLD: 4.07 K/UL — SIGNIFICANT CHANGE UP (ref 3.8–10.5)
WBC # FLD AUTO: 4.07 K/UL — SIGNIFICANT CHANGE UP (ref 3.8–10.5)
WBC UR QL: 1 /HPF — SIGNIFICANT CHANGE UP (ref 0–5)

## 2024-08-26 PROCEDURE — 76801 OB US < 14 WKS SINGLE FETUS: CPT

## 2024-08-26 PROCEDURE — 99285 EMERGENCY DEPT VISIT HI MDM: CPT

## 2024-08-26 PROCEDURE — 36415 COLL VENOUS BLD VENIPUNCTURE: CPT

## 2024-08-26 PROCEDURE — 86900 BLOOD TYPING SEROLOGIC ABO: CPT

## 2024-08-26 PROCEDURE — 87086 URINE CULTURE/COLONY COUNT: CPT

## 2024-08-26 PROCEDURE — 99285 EMERGENCY DEPT VISIT HI MDM: CPT | Mod: 25

## 2024-08-26 PROCEDURE — 76817 TRANSVAGINAL US OBSTETRIC: CPT | Mod: 26

## 2024-08-26 PROCEDURE — 86850 RBC ANTIBODY SCREEN: CPT

## 2024-08-26 PROCEDURE — 80048 BASIC METABOLIC PNL TOTAL CA: CPT

## 2024-08-26 PROCEDURE — 85025 COMPLETE CBC W/AUTO DIFF WBC: CPT

## 2024-08-26 PROCEDURE — 88305 TISSUE EXAM BY PATHOLOGIST: CPT

## 2024-08-26 PROCEDURE — 76801 OB US < 14 WKS SINGLE FETUS: CPT | Mod: 26

## 2024-08-26 PROCEDURE — 86901 BLOOD TYPING SEROLOGIC RH(D): CPT

## 2024-08-26 PROCEDURE — 81001 URINALYSIS AUTO W/SCOPE: CPT

## 2024-08-26 PROCEDURE — 76802 OB US < 14 WKS ADDL FETUS: CPT

## 2024-08-26 PROCEDURE — 88305 TISSUE EXAM BY PATHOLOGIST: CPT | Mod: 26

## 2024-08-26 PROCEDURE — 84702 CHORIONIC GONADOTROPIN TEST: CPT

## 2024-08-26 PROCEDURE — 76817 TRANSVAGINAL US OBSTETRIC: CPT

## 2024-08-26 RX ORDER — METOCLOPRAMIDE HCL 5 MG
10 TABLET ORAL EVERY 6 HOURS
Refills: 0 | Status: DISCONTINUED | OUTPATIENT
Start: 2024-08-26 | End: 2024-08-27

## 2024-08-26 RX ORDER — PANTOPRAZOLE SODIUM 40 MG
20 TABLET, DELAYED RELEASE (ENTERIC COATED) ORAL DAILY
Refills: 0 | Status: DISCONTINUED | OUTPATIENT
Start: 2024-08-26 | End: 2024-08-27

## 2024-08-26 RX ORDER — KETOROLAC TROMETHAMINE 30 MG/ML
30 INJECTION, SOLUTION INTRAMUSCULAR EVERY 6 HOURS
Refills: 0 | Status: DISCONTINUED | OUTPATIENT
Start: 2024-08-26 | End: 2024-08-27

## 2024-08-26 RX ORDER — HYDROMORPHONE HYDROCHLORIDE 2 MG/1
0.5 TABLET ORAL
Refills: 0 | Status: DISCONTINUED | OUTPATIENT
Start: 2024-08-26 | End: 2024-08-27

## 2024-08-26 RX ORDER — ONDANSETRON 2 MG/ML
4 INJECTION, SOLUTION INTRAMUSCULAR; INTRAVENOUS ONCE
Refills: 0 | Status: DISCONTINUED | OUTPATIENT
Start: 2024-08-26 | End: 2024-08-27

## 2024-08-26 RX ORDER — ONDANSETRON 2 MG/ML
8 INJECTION, SOLUTION INTRAMUSCULAR; INTRAVENOUS EVERY 6 HOURS
Refills: 0 | Status: DISCONTINUED | OUTPATIENT
Start: 2024-08-26 | End: 2024-08-27

## 2024-08-26 RX ORDER — OXYCODONE HYDROCHLORIDE 5 MG/1
10 TABLET ORAL EVERY 4 HOURS
Refills: 0 | Status: DISCONTINUED | OUTPATIENT
Start: 2024-08-26 | End: 2024-08-27

## 2024-08-26 RX ORDER — ACETAMINOPHEN 325 MG/1
1000 TABLET ORAL EVERY 6 HOURS
Refills: 0 | Status: DISCONTINUED | OUTPATIENT
Start: 2024-08-26 | End: 2024-08-27

## 2024-08-26 RX ORDER — OXYCODONE HYDROCHLORIDE 5 MG/1
5 TABLET ORAL EVERY 4 HOURS
Refills: 0 | Status: DISCONTINUED | OUTPATIENT
Start: 2024-08-26 | End: 2024-08-27

## 2024-08-26 RX ADMIN — ACETAMINOPHEN 1000 MILLIGRAM(S): 325 TABLET ORAL at 23:05

## 2024-08-26 RX ADMIN — ACETAMINOPHEN 1000 MILLIGRAM(S): 325 TABLET ORAL at 23:36

## 2024-08-26 RX ADMIN — HYDROMORPHONE HYDROCHLORIDE 0.5 MILLIGRAM(S): 2 TABLET ORAL at 22:31

## 2024-08-26 RX ADMIN — HYDROMORPHONE HYDROCHLORIDE 0.5 MILLIGRAM(S): 2 TABLET ORAL at 22:07

## 2024-08-26 NOTE — H&P ADULT - HISTORY OF PRESENT ILLNESS
35yo  at 6w0d by LMP of 7/15 presents from the office today complaining of possible L ovarian ectopic pregnancy.  Pt states she found out she was pregnant 9 days ago when she missed her menses.  Pt noticed about 1 week ago she started to have vaginal spotting and nausea.  Pt went to Dr Barba's office today where she was told he saw a left ovarian ectopic pregnancy. Pt found to have a left adnexal ectopic pregnancy with a fetal heart rate and bhcg of 99285.  Pt to be added on class 2.

## 2024-08-26 NOTE — BRIEF OPERATIVE NOTE - OPERATION/FINDINGS
Melchor catheter placed to drain bladder. Sponge-ring forceps placed into vagina to manipulate uterus. Varess needle inserted and confirmation of intraabdominal entry. 5mm trochar placed in umbilicus. Suprapubic 10mm port placed and left lateral 5mm port placed. Intraabdominal survey revealed left ectopic pregnancy. Bilateral salpingectomy performed for ectopic pregnancy and desire for no future fertility. Excellent hemostasis. Specimens removed through 10mm suprapubic port with endocatch bag. Diffuse suction and irrigation performed. Fascia closed with 1-0 Vicryl on UR6 needle. Skin closed with 4-0 Monocryl. EBL 150cc. UOP 200cc. IVF 1500cc.

## 2024-08-26 NOTE — ASU DISCHARGE PLAN (ADULT/PEDIATRIC) - NS MD DC FALL RISK RISK
For information on Fall & Injury Prevention, visit: [email or web address] OR  [email or web address] OR  https://www.cdc.gov/steadi/patient.html

## 2024-08-26 NOTE — ED ADULT TRIAGE NOTE - CHIEF COMPLAINT QUOTE
Pt presents to ED sent from OBGYN for R/O ectopic pregnancy at 6 weeks. Pt . States, " I missed my period and have nausea, they did an US at my OBGYN and told me to come here".

## 2024-08-26 NOTE — CONSULT NOTE ADULT - SUBJECTIVE AND OBJECTIVE BOX
Pt denies fever, chills, chest pain, SOB, abdominal pain, nausea, vomiting, vaginal bleeding      OB/GYN Hx:  Last PAP smear:     PMHx:   SHx:  Meds:   Allergies:     Social hx:     PHYSICAL EXAM:   Vital Signs Last 24 Hrs  T(C): 37.1 (26 Aug 2024 13:56), Max: 37.1 (26 Aug 2024 13:56)  T(F): 98.8 (26 Aug 2024 13:56), Max: 98.8 (26 Aug 2024 13:56)  HR: 67 (26 Aug 2024 13:56) (67 - 67)  BP: 108/72 (26 Aug 2024 13:56) (108/72 - 108/72)  BP(mean): --  RR: 18 (26 Aug 2024 13:56) (18 - 18)  SpO2: 100% (26 Aug 2024 13:56) (100% - 100%)    Parameters below as of 26 Aug 2024 13:56  Patient On (Oxygen Delivery Method): room air        **************************  Constitutional: Alert & Oriented x3, No acute distress  Respiratory: Clear to ausculation bilaterally; no wheezing, rhonchi, or crackles  Cardiovascular: regular rate and rhythm, no murmurs, or gallops  Gastrointestinal: soft, non tender, positive bowel sounds, no rebound or guarding   Pelvic exam:   Extremities: no calf tenderness or swelling    LABS:                  RADIOLOGY & ADDITIONAL STUDIES: 33yo  at 6w0d by LMP of 7/15 presents from the office today complaining of possible L ovarian ectopic pregnancy.  Pt states she found out she was pregnant 9 days ago when she missed her menses.  Pt noticed about 1 week ago she started to have vaginal spotting and nausea.  Pt went to Dr Barba's office today where she was told he saw a left ovarian ectopic pregnancy.   Pt denies fever, chills, chest pain, SOB, abdominal pain.      OB/GYN Hx: G1:   G2:   G3: L adnexal ectopic pregnancy tx w/ MTX 24  Last PAP smear: wnl    PMHx: denies  SHx: denies  Meds: denies  Allergies: NKDA    Social hx: not a desired pregnancy     PHYSICAL EXAM:   Vital Signs Last 24 Hrs  T(C): 37.1 (26 Aug 2024 13:56), Max: 37.1 (26 Aug 2024 13:56)  T(F): 98.8 (26 Aug 2024 13:56), Max: 98.8 (26 Aug 2024 13:56)  HR: 67 (26 Aug 2024 13:56) (67 - 67)  BP: 108/72 (26 Aug 2024 13:56) (108/72 - 108/72)  BP(mean): --  RR: 18 (26 Aug 2024 13:56) (18 - 18)  SpO2: 100% (26 Aug 2024 13:56) (100% - 100%)    Parameters below as of 26 Aug 2024 13:56  Patient On (Oxygen Delivery Method): room air        **************************  Constitutional: Alert & Oriented x3, No acute distress  Respiratory: Clear to ausculation bilaterally; no wheezing, rhonchi, or crackles  Cardiovascular: regular rate and rhythm, no murmurs, or gallops  Gastrointestinal: soft, non tender, positive bowel sounds, no rebound or guarding   Pelvic exam: refused by pt. stating OBGYN dr Barba did it in office   Extremities: no calf tenderness or swelling    LABS:                  RADIOLOGY & ADDITIONAL STUDIES:

## 2024-08-26 NOTE — ED PROVIDER NOTE - PHYSICAL EXAMINATION
GENERAL:  Awake, alert and in NAD, non-toxic appearing  ENMT: Airway patent  EYES: conjunctiva clear  CARDIAC: Regular rate, regular rhythm.  Heart sounds S1, S2, no S3, S4. No murmurs, rubs or gallops.  RESPIRATORY: Breath sounds clear and equal in bilateral anterior lung fields, no wheezes/ronchi/crackles/stridor; pt breathing and speaking comfortably with no increased WOB, no accessory mm. use, no intercostal retractions, no nasal flaring  GI: abdomen soft, non-distended, non-tender, no rebound or guarding in ruq/luq/rlq/llq/epigastrium, no cvat bilat   gu (pt offered pelvic exam, however states she prefers not to have one as she one in gyn office today)  SKIN: warm and dry, no rashes  PSYCH: awake, alert, calm and cooperative  EXTREMITIES: no ble edema  NEURO: gcs 15

## 2024-08-26 NOTE — H&P ADULT - ASSESSMENT
33yo  at 6w0d by LMP 7/15 found to have Left Adnexal Ectopic pregnancy with FHR and bhcg of 53435.  Pt is not a candidate for MTX due to FHR and bhcg >15K. Pt being added on for class 2 l/s L salpingectomy for ectopic pregnancy. Pt consented and Dr Barba here and available.  Plan to discharge when applicable after procedure.  Pt CBC is wnl and VSS.

## 2024-08-26 NOTE — ED PROVIDER NOTE - IV ALTEPLASE EXCL ABS HIDDEN
show Hydroxyzine Counseling: Patient advised that the medication is sedating and not to drive a car after taking this medication.  Patient informed of potential adverse effects including but not limited to dry mouth, urinary retention, and blurry vision.  The patient verbalized understanding of the proper use and possible adverse effects of hydroxyzine.  All of the patient's questions and concerns were addressed.

## 2024-08-26 NOTE — ED PROVIDER NOTE - NS ED ROS FT
Positive: Abdominal pain, nausea, vaginal spotting   negative: Fever, chills, congestion, cough, chest pain, shortness of breath, dysuria, hematuria, leg edema, rash, lightheadedness

## 2024-08-26 NOTE — ED PROVIDER NOTE - PROGRESS NOTE DETAILS
Sonenthal, MD:   - CBC is within normal limits, BMP unremarkable, hCG 21,000, UA shows small blood, no evidence of UTI, Rh+,  - us:   IMPRESSION:  1. As on the study of 5/4/2024, there is a left adnexal ectopic pregnancy   containing a live fetus. Average ultrasound age of this pregnancy is 6   weeks 0 days.    2. Small free fluid in the pelvis.  -  discussed with OB/GYN who evaluated patient, they state patient will go directly to the OR and to admit to their service

## 2024-08-26 NOTE — CONSULT NOTE ADULT - ASSESSMENT
35yo  at 6w0d by LMP 7/15 ()   -CBC, CMP, UA normal    -benign abd exam, pt has no complaints of abd pain  -T&S pending, rhogam if negative antibody   -Hcg   -TVUS   -plan for ()   -if this is ectopic pt would like MTX 81mg IM   -pt to follow with Dr Barba   -Dr Barba in agreement w/ plan  33yo  at 6w0d by LMP 7/15 ()   -CBC, CMP, UA normal    -benign abd exam, pt has no complaints of abd pain  -T&S pending, rhogam if negative antibody   -Hcg   -TVUS   -plan for ()   -pt to follow with Dr Barba   -Dr Barba in agreement w/ plan

## 2024-08-26 NOTE — H&P ADULT - NSHPPHYSICALEXAM_GEN_ALL_CORE
PHYSICAL EXAM:   Vital Signs Last 24 Hrs  T(C): 37.1 (26 Aug 2024 13:56), Max: 37.1 (26 Aug 2024 13:56)  T(F): 98.8 (26 Aug 2024 13:56), Max: 98.8 (26 Aug 2024 13:56)  HR: 67 (26 Aug 2024 13:56) (67 - 67)  BP: 108/72 (26 Aug 2024 13:56) (108/72 - 108/72)  BP(mean): --  RR: 18 (26 Aug 2024 13:56) (18 - 18)  SpO2: 100% (26 Aug 2024 13:56) (100% - 100%)    Parameters below as of 26 Aug 2024 13:56  Patient On (Oxygen Delivery Method): room air        **************************  Constitutional: Alert & Oriented x3, No acute distress  Respiratory: Clear to ausculation bilaterally; no wheezing, rhonchi, or crackles  Cardiovascular: regular rate and rhythm, no murmurs, or gallops  Gastrointestinal: soft, non tender, positive bowel sounds, no rebound or guarding   Pelvic exam: refused by pt. stating OBGYN dr Barba did it in office   Extremities: no calf tenderness or swelling    LABS:

## 2024-08-26 NOTE — H&P ADULT - NSHPLABSRESULTS_GEN_ALL_CORE
12.6   4.07  )-----------( 184      ( 26 Aug 2024 14:08 )             37.9   08-26    142  |  107  |  7   ----------------------------<  97  4.1   |  27  |  0.67    Ca    9.4      26 Aug 2024 14:08    [facility] [place] [address]  B+

## 2024-08-26 NOTE — ASU DISCHARGE PLAN (ADULT/PEDIATRIC) - CARE PROVIDER_API CALL
Alex Barba  Obstetrics and Gynecology  [address] [place] [address], [address]  [place] [address]  Phone: [phone]  Fax: [phone]  Follow Up Time:

## 2024-08-26 NOTE — ED PROVIDER NOTE - CLINICAL SUMMARY MEDICAL DECISION MAKING FREE TEXT BOX
34F  LMP 7/15/24 c PMH of ectopic pregnancy (3 m/a, tx medically), no PSH sent to ED from gyn office for known ectopic pregnancy. On exam, bp 108/72, VS otherwise wnl, no remarkable exam findings.    ddx: ectopic pregnancy vs iup vs pregnancy of unknown location    Plan:  - labs  - tvus  - zofran  - d/w gyn who will evaluate pt at bedside, requests labs and us

## 2024-08-26 NOTE — CHART NOTE - NSCHARTNOTEFT_GEN_A_CORE
08-26-24 @ 16:58    Dear whomever this concerns regarding Ms. CHRISTIAN JACOBSON,      We are writing to inform you that Ms Jacobson had to have an emergency operation on Monday 8/26.  Please excuse her from work for the rest of the week. Please call [phone] with any questions.       Thank you,        Geraldine Mojica PA-C  Obstetrics and Gynecology   [facility]   [address] [place] [address]  [phone]

## 2024-08-26 NOTE — PRE-ANESTHESIA EVALUATION ADULT - NSANTHOSAYNRD_GEN_A_CORE
No. SOLA screening performed.  STOP BANG Legend: 0-2 = LOW Risk; 3-4 = INTERMEDIATE Risk; 5-8 = HIGH Risk

## 2024-08-26 NOTE — ASU DISCHARGE PLAN (ADULT/PEDIATRIC) - CALL YOUR DOCTOR IF YOU HAVE ANY OF THE FOLLOWING:
Pain not relieved by Medications/Fever greater than (need to indicate Fahrenheit or Celsius)/Wound/Surgical Site with redness, or foul smelling discharge or pus/Numbness, tingling, color or temperature change to extremity/Nausea and vomiting that does not stop/Unable to urinate

## 2024-08-26 NOTE — ED PROVIDER NOTE - OBJECTIVE STATEMENT
34F  LMP 7/15/24 c PMH of ectopic pregnancy (3 m/a, tx medically), no PSH sent to ED from gyn office for known ectopic pregnancy. Patient states she has had an ectopic 3 months ago that was treated medically.  Patient went to her OB/GYN today who did a bedside ultrasound which showed a left-sided ectopic pregnancy and was told to come to the ER.  Patient reports 1 week history of light vaginal spotting.  Reports some associated nausea.   Denies blood thinner use.     gyn: Dr. Funes

## 2024-08-27 VITALS
DIASTOLIC BLOOD PRESSURE: 57 MMHG | SYSTOLIC BLOOD PRESSURE: 96 MMHG | HEART RATE: 66 BPM | RESPIRATION RATE: 14 BRPM | OXYGEN SATURATION: 100 %

## 2024-08-27 LAB
CULTURE RESULTS: NO GROWTH — SIGNIFICANT CHANGE UP
SPECIMEN SOURCE: SIGNIFICANT CHANGE UP

## 2024-08-27 NOTE — PROGRESS NOTE ADULT - SUBJECTIVE AND OBJECTIVE BOX
Pt seen and examined at bedside. Pt complains of mild abdominal pain.   Pt denies any fever, chills, chest pain, SOB, nausea or vomiting     T(F): 97.1 (08-26-24 @ 20:41), Max: 98.8 (08-26-24 @ 13:56)  HR: 66 (08-27-24 @ 00:00) (54 - 71)  BP: 96/57 (08-27-24 @ 00:00) (91/50 - 115/66)  RR: 14 (08-27-24 @ 00:00) (10 - 21)  SpO2: 100% (08-27-24 @ 00:00) (97% - 100%)  Wt(kg): --      acetaminophen     Tablet .. 1000 milliGRAM(s) Oral every 6 hours  HYDROmorphone  Injectable 0.5 milliGRAM(s) IV Push every 15 minutes PRN Severe Pain (7 - 10)  ketorolac   Injectable 30 milliGRAM(s) IV Push every 6 hours  lactated ringers. 1000 milliLiter(s) IV Continuous <Continuous>  metoclopramide Injectable 10 milliGRAM(s) IV Push every 6 hours PRN Nausea and/or Vomiting  ondansetron Injectable 4 milliGRAM(s) IV Push once  ondansetron Injectable 8 milliGRAM(s) IV Push every 6 hours PRN Nausea and/or Vomiting  oxyCODONE    IR 5 milliGRAM(s) Oral every 4 hours PRN Moderate Pain (4 - 6)  oxyCODONE    IR 10 milliGRAM(s) Oral every 4 hours PRN Severe Pain (7 - 10)  pantoprazole  Injectable 20 milliGRAM(s) IV Push daily  simethicone 80 milliGRAM(s) Chew every 6 hours      Physical exam:  Constitutional: NAD  Abdomen: incision site clean, dry and intact. Soft, mildly tender, nondistended  Extremities: no lower extremity edema, or calve tenderness. SCDs in place    A:   34y, s/p l/s BTL          . Uncomplicated.   Post operative check performed.    Plan:  1. Vital signs stable, continue to monitor per protocol  2. Pain control Tylenol/Toradol, Oxycodone PRN for BTP.  3. DVT prophylaxis: SCDs  4. CV: IVH   5. Pulm: Incentive spirometer (at least 10 times per hour while awake)   6. GI:  regular diet   7. : voiding  8. Activity: ambulate as tolerated

## 2024-09-03 DIAGNOSIS — O00.102 LEFT TUBAL PREGNANCY WITHOUT INTRAUTERINE PREGNANCY: ICD-10-CM

## 2024-09-03 LAB — SURGICAL PATHOLOGY STUDY: SIGNIFICANT CHANGE UP
